# Patient Record
Sex: FEMALE | Race: BLACK OR AFRICAN AMERICAN | NOT HISPANIC OR LATINO | Employment: PART TIME | URBAN - METROPOLITAN AREA
[De-identification: names, ages, dates, MRNs, and addresses within clinical notes are randomized per-mention and may not be internally consistent; named-entity substitution may affect disease eponyms.]

---

## 2017-09-08 ENCOUNTER — OFFICE VISIT (OUTPATIENT)
Dept: URGENT CARE | Facility: CLINIC | Age: 32
End: 2017-09-08
Payer: MEDICAID

## 2017-09-08 VITALS
SYSTOLIC BLOOD PRESSURE: 142 MMHG | TEMPERATURE: 100 F | BODY MASS INDEX: 45.99 KG/M2 | RESPIRATION RATE: 18 BRPM | OXYGEN SATURATION: 98 % | HEART RATE: 82 BPM | HEIGHT: 67 IN | DIASTOLIC BLOOD PRESSURE: 86 MMHG | WEIGHT: 293 LBS

## 2017-09-08 DIAGNOSIS — R30.0 DYSURIA: ICD-10-CM

## 2017-09-08 DIAGNOSIS — N39.0 URINARY TRACT INFECTION WITHOUT HEMATURIA, SITE UNSPECIFIED: Primary | ICD-10-CM

## 2017-09-08 LAB
BILIRUB UR QL STRIP: NEGATIVE
GLUCOSE UR QL STRIP: NEGATIVE
KETONES UR QL STRIP: NEGATIVE
LEUKOCYTE ESTERASE UR QL STRIP: POSITIVE
PH, POC UA: 6.5 (ref 5–8)
POC BLOOD, URINE: NEGATIVE
POC NITRATES, URINE: NEGATIVE
PROT UR QL STRIP: POSITIVE
SP GR UR STRIP: 1.02 (ref 1–1.03)
UROBILINOGEN UR STRIP-ACNC: NORMAL (ref 0.1–1.1)

## 2017-09-08 PROCEDURE — 3008F BODY MASS INDEX DOCD: CPT | Mod: S$GLB,,, | Performed by: FAMILY MEDICINE

## 2017-09-08 PROCEDURE — 81003 URINALYSIS AUTO W/O SCOPE: CPT | Mod: QW,S$GLB,, | Performed by: FAMILY MEDICINE

## 2017-09-08 PROCEDURE — 99203 OFFICE O/P NEW LOW 30 MIN: CPT | Mod: 25,S$GLB,, | Performed by: FAMILY MEDICINE

## 2017-09-08 RX ORDER — CIPROFLOXACIN 500 MG/1
500 TABLET ORAL 2 TIMES DAILY
Qty: 20 TABLET | Refills: 0 | Status: SHIPPED | OUTPATIENT
Start: 2017-09-08 | End: 2017-09-18

## 2017-09-08 RX ORDER — PHENAZOPYRIDINE HYDROCHLORIDE 200 MG/1
200 TABLET, FILM COATED ORAL 3 TIMES DAILY PRN
Qty: 12 TABLET | Refills: 0 | Status: SHIPPED | OUTPATIENT
Start: 2017-09-08 | End: 2017-10-23 | Stop reason: ALTCHOICE

## 2017-09-08 NOTE — PROGRESS NOTES
"Subjective:       Patient ID: Jeb Stuart is a 31 y.o. female.    Vitals:  height is 5' 7" (1.702 m) and weight is 156.9 kg (346 lb) (abnormal). Her temperature is 99.9 °F (37.7 °C). Her blood pressure is 142/86 (abnormal) and her pulse is 82. Her respiration is 18 and oxygen saturation is 98%.     Chief Complaint: Urinary Tract Infection    Urinary Tract Infection    This is a new problem. Episode onset: 3 days. The problem occurs intermittently. The problem has been gradually worsening. The quality of the pain is described as burning. The pain is at a severity of 4/10. The pain is mild. There has been no fever. She is sexually active. There is no history of pyelonephritis. Associated symptoms include urgency. Pertinent negatives include no chills, hematuria, nausea or vomiting. She has tried NSAIDs for the symptoms. The treatment provided no relief. Her past medical history is significant for recurrent UTIs.     Review of Systems   Constitution: Negative for chills and fever.   Musculoskeletal: Positive for back pain.   Gastrointestinal: Positive for abdominal pain (lower). Negative for nausea and vomiting.   Genitourinary: Positive for dysuria and urgency. Negative for genital sores, hematuria, missed menses and non-menstrual bleeding.       Objective:      Physical Exam   Constitutional: She is oriented to person, place, and time. She appears well-developed and well-nourished.   HENT:   Head: Normocephalic and atraumatic.   Right Ear: External ear normal.   Left Ear: External ear normal.   Nose: Nose normal. No nasal deformity. No epistaxis.   Mouth/Throat: Oropharynx is clear and moist and mucous membranes are normal.   Eyes: Conjunctivae and lids are normal.   Neck: Trachea normal, normal range of motion and phonation normal. Neck supple.   Cardiovascular: Normal rate, regular rhythm, normal heart sounds and normal pulses.    Pulmonary/Chest: Effort normal and breath sounds normal.   Abdominal: Soft. Normal " appearance and bowel sounds are normal. She exhibits no distension and no mass. There is tenderness in the suprapubic area. There is no rigidity, no rebound, no guarding, no CVA tenderness, no tenderness at McBurney's point and negative Littlejohn's sign.       Neurological: She is alert and oriented to person, place, and time.   Skin: Skin is warm, dry and intact.   Psychiatric: She has a normal mood and affect. Her speech is normal and behavior is normal. Cognition and memory are normal.   Nursing note and vitals reviewed.    POCT Urinalysis, Dipstick, Automated, W/O Scope   Order: [id number]   Status:  Final result   Visible to patient:  No (Not Released) Next appt:  None Dx:  Dysuria    Ref Range & Units 15:24   POC Blood, Urine Negative  Negative    POC Bilirubin, Urine Negative  Negative    POC Urobilinogen, Urine 0.1 - 1.1 NORMAL    POC Ketones, Urine Negative  Negative    POC Protein, Urine Negative  Positive     Comments: 30 mg/ul   POC Nitrates, Urine Negative  Negative    POC Glucose, Urine Negative  Negative    pH, UA 5 - 8 6.5    POC Specific Gravity, Urine 1.003 - 1.029 1.025    POC Leukocytes, Urine Negative  Positive     Comments: 10 WBC/ul    Resulting Agency  [facility]      Specimen Collected: 09/08/17 15:24 Last Resulted: 09/08/17 15:24                 Assessment:       1. Urinary tract infection without hematuria, site unspecified    2. Dysuria        Plan:         Urinary tract infection without hematuria, site unspecified    Dysuria  -     POCT Urinalysis, Dipstick, Automated, W/O Scope    Other orders  -     ciprofloxacin HCl (CIPRO) 500 MG tablet; Take 1 tablet (500 mg total) by mouth 2 (two) times daily.  Dispense: 20 tablet; Refill: 0  -     phenazopyridine (PYRIDIUM) 200 MG tablet; Take 1 tablet (200 mg total) by mouth 3 (three) times daily as needed for Pain.  Dispense: 12 tablet; Refill: 0      Please return here or go to the Emergency Department for any concerns or worsening of condition.  If you  were prescribed antibiotics, please take them to completion.  If you were prescribed a narcotic medication, do not drive or operate heavy equipment or machinery while taking these medications.  Please follow up with your primary care doctor or specialist as needed.  Please drink plenty of fluids.  Please get plenty of rest.  If you were prescribed Pyridium (phenazopyridine), please be aware that if you wear contact lens that this medication may stain your contacts.  While taking this medication it is recommended that you do not wear your contacts until 24 hours after your last dose.    Push fluids aggressively to improve symptoms and wash through the infection.  Cranberry juice can help relieve symptoms  If you  smoke, please stop smoking.    Please follow up with your primary care doctor or specialist as needed.    Zarina Coffman MD  [phone]

## 2017-09-08 NOTE — PATIENT INSTRUCTIONS
"Please return here or go to the Emergency Department for any concerns or worsening of condition.  If you were prescribed antibiotics, please take them to completion.  If you were prescribed a narcotic medication, do not drive or operate heavy equipment or machinery while taking these medications.  Please follow up with your primary care doctor or specialist as needed.  Please drink plenty of fluids.  Please get plenty of rest.  If you were prescribed Pyridium (phenazopyridine), please be aware that if you wear contact lens that this medication may stain your contacts.  While taking this medication it is recommended that you do not wear your contacts until 24 hours after your last dose.    Push fluids aggressively to improve symptoms and wash through the infection.  Cranberry juice can help relieve symptoms  If you  smoke, please stop smoking.    Please follow up with your primary care doctor or specialist as needed.    Zarina Coffman MD  [phone]      Bladder Infection, Female (Adult)    Urine is normally doesn't have any bacteria in it. But bacteria can get into the urinary tract from the skin around the rectum. Or they can travel in the blood from elsewhere in the body. Once they are in your urinary tract, they can cause infection in the urethra (urethritis), the bladder (cystitis), or the kidneys (pyelonephritis).  The most common place for an infection is in the bladder. This is called a bladder infection. This is one of the most common infections in women. Most bladder infections are easily treated. They are not serious unless the infection spreads to the kidney.  The phrases "bladder infection," "UTI," and "cystitis" are often used to describe the same thing. But they are not always the same. Cystitis is an inflammation of the bladder. The most common cause of cystitis is an infection.  Symptoms  The infection causes inflammation in the urethra and bladder. This causes many of the symptoms. The most " common symptoms of a bladder infection are:  · Pain or burning when urinating  · Having to urinate more often than usual  · Urgent need to urinate  · Only a small amount of urine comes out  · Blood in urine  · Abdominal discomfort. This is usually in the lower abdomen above the pubic bone.  · Cloudy urine  · Strong- or bad-smelling urine  · Unable to urinate (urinary retention)  · Unable to hold urine in (urinary incontinence)  · Fever  · Loss of appetite  · Confusion (in older adults)  Causes  Bladder infections are not contagious. You can't get one from someone else, from a toilet seat, or from sharing a bath.  The most common cause of bladder infections is bacteria from the bowels. The bacteria get onto the skin around the opening of the urethra. From there, they can get into the urine and travel up to the bladder, causing inflammation and infection. This usually happens because of:  · Wiping improperly after urinating. Always wipe from front to back.  · Bowel incontinence  · Pregnancy  · Procedures such as having a catheter inserted  · Older age  · Not emptying your bladder. This can allow bacteria a chance to grow in your urine.  · Dehydration  · Constipation  · Sex  · Use of a diaphragm for birth control   Treatment  Bladder infections are diagnosed by a urine test. They are treated with antibiotics and usually clear up quickly without complications. Treatment helps prevent a more serious kidney infection.  Medicines  Medicines can help in the treatment of a bladder infection:  · Take antibiotics until they are used up, even if you feel better. It is important to finish them to make sure the infection has cleared.  · You can use acetaminophen or ibuprofen for pain, fever, or discomfort, unless another medicine was prescribed. If you have chronic liver or kidney disease, talk with your healthcare provider before using these medicines. Also talk with your provider if you've ever had a stomach ulcer or  gastrointestinal bleeding, or are taking blood-thinner medicines.  · If you are given phenazopydridine to reduce burning with urination, it will cause your urine to become a bright orange color. This can stain clothing.  Care and prevention  These self-care steps can help prevent future infections:  · Drink plenty of fluids to prevent dehydration and flush out your bladder. Do this unless you must restrict fluids for other health reasons, or your doctor told you not to.  · Proper cleaning after going to the bathroom is important. Wipe from front to back after using the toilet to prevent the spread of bacteria.  · Urinate more often. Don't try to hold urine in for a long time.  · Wear loose-fitting clothes and cotton underwear. Avoid tight-fitting pants.  · Improve your diet and prevent constipation. Eat more fresh fruit and vegetables, and fiber, and less junk and fatty foods.  · Avoid sex until your symptoms are gone.  · Avoid caffeine, alcohol, and spicy foods. These can irritate your bladder.  · Urinate right after intercourse to flush out your bladder.  · If you use birth control pills and have frequent bladder infections, discuss it with your doctor.  Follow-up care  Call your healthcare provider if all symptoms are not gone after 3 days of treatment. This is especially important if you have repeat infections.  If a culture was done, you will be told if your treatment needs to be changed. If directed, you can call to find out the results.  If X-rays were done, you will be told if the results will affect your treatment.  Call 911  Call 911 if any of the following occur:  · Trouble breathing  · Hard to wake up or confusion  · Fainting or loss of consciousness  · Rapid heart rate  When to seek medical advice  Call your healthcare provider right away if any of these occur:  · Fever of 100.4ºF (38.0ºC) or higher, or as directed by your healthcare provider  · Symptoms are not better by the third day of  treatment  · Back or belly (abdominal) pain that gets worse  · Repeated vomiting, or unable to keep medicine down  · Weakness or dizziness  · Vaginal discharge  · Pain, redness, or swelling in the outer vaginal area (labia)  Date Last Reviewed: 10/1/2016  © 3725-2932 [employer]. [address] [place] [address]. All rights reserved. This information is not intended as a substitute for professional medical care. Always follow your healthcare professional's instructions.

## 2017-09-12 ENCOUNTER — TELEPHONE (OUTPATIENT)
Dept: URGENT CARE | Facility: CLINIC | Age: 32
End: 2017-09-12

## 2017-09-12 NOTE — TELEPHONE ENCOUNTER
Patient called back stating never filled pain medicine prescribed due to high costs, Per Dr. Rivera, told patient to get AZO over the counter, per Dr. Rivera orders.

## 2017-10-26 ENCOUNTER — OFFICE VISIT (OUTPATIENT)
Dept: URGENT CARE | Facility: CLINIC | Age: 32
End: 2017-10-26
Payer: MEDICAID

## 2017-10-26 VITALS
BODY MASS INDEX: 51.53 KG/M2 | OXYGEN SATURATION: 100 % | SYSTOLIC BLOOD PRESSURE: 146 MMHG | DIASTOLIC BLOOD PRESSURE: 89 MMHG | HEART RATE: 80 BPM | TEMPERATURE: 98 F | WEIGHT: 293 LBS

## 2017-10-26 DIAGNOSIS — Z20.2 POSSIBLE EXPOSURE TO STD: ICD-10-CM

## 2017-10-26 DIAGNOSIS — R30.0 DYSURIA: Primary | ICD-10-CM

## 2017-10-26 LAB
BILIRUB UR QL STRIP: NEGATIVE
GLUCOSE UR QL STRIP: NEGATIVE
KETONES UR QL STRIP: NEGATIVE
LEUKOCYTE ESTERASE UR QL STRIP: NEGATIVE
PH, POC UA: 7.5 (ref 5–8)
POC BLOOD, URINE: NEGATIVE
POC NITRATES, URINE: NEGATIVE
PROT UR QL STRIP: NEGATIVE
SP GR UR STRIP: 1.01 (ref 1–1.03)
UROBILINOGEN UR STRIP-ACNC: NORMAL (ref 0.1–1.1)

## 2017-10-26 PROCEDURE — 99203 OFFICE O/P NEW LOW 30 MIN: CPT | Mod: 25,S$GLB,, | Performed by: SURGERY

## 2017-10-26 PROCEDURE — 81003 URINALYSIS AUTO W/O SCOPE: CPT | Mod: QW,S$GLB,, | Performed by: SURGERY

## 2017-10-26 RX ORDER — CEFTRIAXONE 1 G/1
500 INJECTION, POWDER, FOR SOLUTION INTRAMUSCULAR; INTRAVENOUS ONCE
Status: COMPLETED | OUTPATIENT
Start: 2017-10-26 | End: 2017-10-26

## 2017-10-26 RX ORDER — METRONIDAZOLE 500 MG/1
2000 TABLET ORAL ONCE
Qty: 4 TABLET | Refills: 0 | Status: SHIPPED | OUTPATIENT
Start: 2017-10-26 | End: 2017-10-26

## 2017-10-26 RX ORDER — AZITHROMYCIN 500 MG/1
1000 TABLET, FILM COATED ORAL ONCE
Qty: 2 TABLET | Refills: 0 | Status: SHIPPED | OUTPATIENT
Start: 2017-10-26 | End: 2017-10-26

## 2017-10-26 RX ADMIN — CEFTRIAXONE 500 MG: 1 INJECTION, POWDER, FOR SOLUTION INTRAMUSCULAR; INTRAVENOUS at 12:10

## 2017-10-26 NOTE — PATIENT INSTRUCTIONS
Dysuria with Uncertain Cause (Adult)    The urethra is the tube that allows urine to pass out of the body. In a woman, the urethra is the opening above the vagina. In men, the urethra is the opening on the tip of the penis. Dysuria is the feeling of pain or burning in the urethra when passing urine.  Dysuria can be caused by anything that irritates or inflames the urethra. An infection or chemical irritation can cause this reaction. A bladder infection is the most common cause of dysuria in adults. A urine test can diagnose this. A bladder infection needs antibiotic treatment.  Soaps, lotions, colognes and feminine hygiene products can cause dysuria. So can birth control jellies, creams, and foams. It will go away 1 to 3 days after using these irritants.  Sexually transmitted diseases (STDs) such as chlamydia or gonorrhea can cause dysuria. Your healthcare provider may take a culture sample. Your provider may start you on antibiotic medicine before the culture test returns.  In women who have gone through menopause, dysuria can be from dryness in the lining of the urethra. This can be treated with hormones. Dysuria becomes long-term (chronic) when it lasts for weeks or months. You may need to see a specialist (urologist) to diagnose and treat chronic dysuria.  Home care  These home care tips may help:  · Don't use any chemicals or products that you think may be causing your symptoms.  · If you were given a prescription medicine, take as directed. Be sure to take it until it is all used up.  · If a culture was taken, don't have sex until you have been told that it is negative. This means you don't have an infection. Then follow your healthcare provider's advice to treat your condition.  If a culture was done and it is positive:  · Both you and your sexual partner may need to be treated. This is true even if your partner has no symptoms.  · Contact your healthcare provider or go to an urgent care clinic or the  [facility] health department to be looked at and treated.  · Don't have sex until both you and your partner(s) have finished all antibiotics and your healthcare provider says you are no longer contagious.  · Learn about and use safe sex practices. The safest sex is with a partner who has tested negative and only has sex with you. Condoms can prevent STDs from spreading, but they aren't a guarantee.  Follow-up care  Follow up with your healthcare provider, or as advised. If a culture was taken, you may call as directed for the results. If you have an STD, follow up with your provider or the public health department for a complete STD screening, including HIV testing. For more information, contact CDC-INFO at 901-628-0633.  When to seek medical advice  Call your healthcare provider right away if any of these occur:  · You aren't better after 3 days of treatment  · Fever of 100.4ºF (38ºC) or higher, or as directed by your healthcare provider  · Back or belly pain that gets worse  · You can't urinate because of pain  · New discharge from the urethra, vagina, or penis  · Painful sores on the penis  · Rash or joint pain  · Painful lumps (lymph nodes) in the groin  · Testicle pain or swelling of the scrotum  Date Last Reviewed: 11/1/2016  © 7897-9610 [employer]. [address] [place] [address], [place] [address]. All rights reserved. This information is not intended as a substitute for professional medical care. Always follow your healthcare professional's instructions.

## 2017-10-26 NOTE — PROGRESS NOTES
Subjective:       Patient ID: Jeb Stuart is a 32 y.o. female.    Vitals:  weight is 149.2 kg (329 lb) (abnormal). Her oral temperature is 97.6 °F (36.4 °C). Her blood pressure is 146/89 (abnormal) and her pulse is 80. Her oxygen saturation is 100%.     Chief Complaint: Urinary Tract Infection    Urinary Tract Infection    This is a new problem. The current episode started yesterday. The problem has been gradually worsening. The quality of the pain is described as burning. The pain is at a severity of 10/10. The pain is severe. There has been no fever. She is sexually active. Associated symptoms include a discharge, frequency and urgency. Pertinent negatives include no chills, hematuria, nausea or vomiting. She has tried nothing for the symptoms.     Review of Systems   Constitution: Negative for chills and fever.   Skin: Negative for itching.   Musculoskeletal: Negative for back pain.   Gastrointestinal: Negative for abdominal pain, nausea and vomiting.   Genitourinary: Positive for frequency and urgency. Negative for dysuria, genital sores, hematuria, missed menses and non-menstrual bleeding.       Objective:      Physical Exam   Constitutional: She is oriented to person, place, and time. She appears well-developed and well-nourished.   HENT:   Head: Normocephalic and atraumatic.   Right Ear: External ear normal.   Left Ear: External ear normal.   Nose: Nose normal.   Mouth/Throat: Mucous membranes are normal.   Eyes: Conjunctivae and lids are normal.   Neck: Trachea normal and full passive range of motion without pain. Neck supple.   Cardiovascular: Normal rate, regular rhythm and normal heart sounds.    Pulmonary/Chest: Effort normal and breath sounds normal. No respiratory distress.   Abdominal: Soft. Normal appearance and bowel sounds are normal. She exhibits no distension, no abdominal bruit, no pulsatile midline mass and no mass. There is no tenderness.   Musculoskeletal: Normal range of motion.  She exhibits no edema.   Neurological: She is alert and oriented to person, place, and time. She has normal strength.   Skin: Skin is warm, dry and intact. She is not diaphoretic. No pallor.   Psychiatric: She has a normal mood and affect. Her speech is normal and behavior is normal. Judgment and thought content normal. Cognition and memory are normal.   Nursing note and vitals reviewed.      Assessment:       1. Dysuria    2. Possible exposure to STD        Plan:         Dysuria  -     POCT Urinalysis, Dipstick, Automated, W/O Scope  -     metroNIDAZOLE (FLAGYL) 500 MG tablet; Take 4 tablets (2,000 mg total) by mouth once.  Dispense: 4 tablet; Refill: 0    Possible exposure to STD    Other orders  -     azithromycin (ZITHROMAX) 500 MG tablet; Take 2 tablets (1,000 mg total) by mouth once.  Dispense: 2 tablet; Refill: 0  -     cefTRIAXone injection 500 mg; Inject 0.5 g (500 mg total) into the muscle once.

## 2017-11-07 ENCOUNTER — TELEPHONE (OUTPATIENT)
Dept: URGENT CARE | Facility: CLINIC | Age: 32
End: 2017-11-07

## 2017-12-16 ENCOUNTER — OFFICE VISIT (OUTPATIENT)
Dept: URGENT CARE | Facility: CLINIC | Age: 32
End: 2017-12-16
Payer: MEDICAID

## 2017-12-16 VITALS
SYSTOLIC BLOOD PRESSURE: 139 MMHG | OXYGEN SATURATION: 100 % | TEMPERATURE: 99 F | HEART RATE: 72 BPM | WEIGHT: 293 LBS | DIASTOLIC BLOOD PRESSURE: 74 MMHG | BODY MASS INDEX: 47.09 KG/M2 | HEIGHT: 66 IN

## 2017-12-16 DIAGNOSIS — R30.0 DYSURIA: Primary | ICD-10-CM

## 2017-12-16 DIAGNOSIS — J02.9 ACUTE PHARYNGITIS, UNSPECIFIED ETIOLOGY: ICD-10-CM

## 2017-12-16 LAB
BILIRUB UR QL STRIP: NEGATIVE
GLUCOSE UR QL STRIP: NEGATIVE
KETONES UR QL STRIP: NEGATIVE
LEUKOCYTE ESTERASE UR QL STRIP: NEGATIVE
PH, POC UA: 5.5 (ref 5–8)
POC BLOOD, URINE: NEGATIVE
POC NITRATES, URINE: NEGATIVE
PROT UR QL STRIP: NEGATIVE
SP GR UR STRIP: 1.02 (ref 1–1.03)
UROBILINOGEN UR STRIP-ACNC: NORMAL (ref 0.1–1.1)

## 2017-12-16 PROCEDURE — 99214 OFFICE O/P EST MOD 30 MIN: CPT | Mod: 25,S$GLB,, | Performed by: FAMILY MEDICINE

## 2017-12-16 PROCEDURE — 81003 URINALYSIS AUTO W/O SCOPE: CPT | Mod: QW,S$GLB,, | Performed by: FAMILY MEDICINE

## 2017-12-16 RX ORDER — DEXAMETHASONE SODIUM PHOSPHATE 4 MG/ML
4 INJECTION, SOLUTION INTRA-ARTICULAR; INTRALESIONAL; INTRAMUSCULAR; INTRAVENOUS; SOFT TISSUE
Status: COMPLETED | OUTPATIENT
Start: 2017-12-16 | End: 2017-12-16

## 2017-12-16 RX ORDER — LEVOFLOXACIN 500 MG/1
500 TABLET, FILM COATED ORAL DAILY
Qty: 10 TABLET | Refills: 0 | Status: SHIPPED | OUTPATIENT
Start: 2017-12-16 | End: 2017-12-18 | Stop reason: ALTCHOICE

## 2017-12-16 RX ADMIN — DEXAMETHASONE SODIUM PHOSPHATE 4 MG: 4 INJECTION, SOLUTION INTRA-ARTICULAR; INTRALESIONAL; INTRAMUSCULAR; INTRAVENOUS; SOFT TISSUE at 05:12

## 2017-12-16 NOTE — LETTER
December 16, 2017  Jeb Stuart  [address]  [place] [address]                [facility] [place]  [address], [address]  [place] [address]  Phone: [phone]  Fax: [phone] Jeb Sturat was seen and treated in our Urgent Care department on 12/16/2017. She may return to work in 2 - 3 days.      If you have any questions or concerns, please don't hesitate to call.    Sincerely,        Lacho Back MD

## 2017-12-16 NOTE — PATIENT INSTRUCTIONS
Please drink plenty of fluids.  Please get plenty of rest.  Please return here or go to the Emergency Department for any concerns or worsening of condition.  If you were given wait & see antibiotics, please wait 3-5 days before taking them, and only take them if your symptoms have worsened or not improved.  If you do begin taking the antibiotics, please take them to completion.  If you were prescribed antibiotics, please take them to completion.    If you were prescribed a narcotic medication, do not drive or operate heavy equipment or machinery while taking these medications.    You were given a decongestant (RESCON or POLY VENT Dm).  If your insurance does not cover it or you cannot afford it, it is ok to use the over the counter products listed below.  If you do not have Hypertension or any history of palpitations, it is ok to take over the counter Sudafed or Mucinex D or Allegra-D or Claritin-D or Zyrtec-D.  If you do take one of the above, it is ok to combine that with plain over the counter Mucinex or Allegra or Claritin or Zyrtec.  If for example you are taking Zyrtec -D, you can combine that with Mucinex, but not Mucinex-D.  If you are taking Mucinex-D, you can combine that with plain Allegra or Claritin or Zyrtec.   If you do have Hypertension or palpitations, it is safe to take Coricidin HBP for relief of sinus symptoms.    We recommend you take over the counter Flonase (Fluticasone) or another nasally inhaled steroid unless you are already taking one.      Nasal irrigation with a saline spray or Netti Pot like device per their directions is also recommended.  If not allergic, please take over the counter Tylenol (Acetaminophen)  as directed for control of pain and/or fever.  You may take Sudafed every 6 hours as directed for congestion.    You were given an injection of steroid.  This will relieve swelling and inflammation and improve respiratory symptoms.  It can raise your blood sugar if you are  diabetic.    Please follow up with your primary care doctor or specialist as needed.    Zarina Coffman MD  [phone]    If you  smoke, please stop smoking.         Pharyngitis: Strep (Presumed)    You have pharyngitis (sore throat). The cause is thought to be the streptococcus, or strep, bacterium. Strep throat infection can cause throat pain that is worse when swallowing, aching all over, headache, and fever. The infection may be spread by coughing, kissing, or touching others after touching your mouth or nose. Antibiotic medications are given to treat the infection.  Home care  · Rest at home. Drink plenty of fluids to avoid dehydration.  · No work or school for the first 2 days of taking the antibiotics. After this time, you will not be contagious. You can then return to work or school if you are feeling better.   · The antibiotic medication must be taken for the full 10 days, even if you feel better. This is very important to ensure the infection is treated. It is also important to prevent drug-resistant organisms from developing. If you were given an antibiotic shot, no more antibiotics are needed.  · You may use acetaminophen or ibuprofen to control pain or fever, unless another medicine was prescribed for this. If you have chronic liver or kidney disease or ever had a stomach ulcer or GI bleeding, talk with your doctor before using these medicines.  · Throat lozenges or a throat-numbing sprays can help reduce throat pain. Gargling with warm salt water can also help. Dissolve 1/2 teaspoon of salt in 1 8 ounce glass of warm water.   · Avoid salty or spicy foods, which can irritate the throat.  Follow-up care  Follow up with your healthcare provider or our staff if you are not improving over the next week.  When to seek medical advice  Call your healthcare provider right away if any of these occur:  · Fever as directed by your doctor.   · New or worsening ear pain, sinus pain, or headache  · Painful lumps in  the back of neck  · Stiff neck  · Lymph nodes are getting larger  · Inability to swallow liquids, excessive drooling, or inability to open mouth wide due to throat pain  · Signs of dehydration (very dark urine or no urine, sunken eyes, dizziness)  · Trouble breathing or noisy breathing  · Muffled voice  · New rash  Date Last Reviewed: 4/13/2015  © 0502-8308 [employer]. [address] [place] [address], [place] [address]. All rights reserved. This information is not intended as a substitute for professional medical care. Always follow your healthcare professional's instructions.

## 2017-12-16 NOTE — PROGRESS NOTES
"Subjective:       Patient ID: Jeb Stuart is a 32 y.o. female.    Vitals:  height is 5' 6" (1.676 m) and weight is 144.2 kg (318 lb) (abnormal). Her oral temperature is 99 °F (37.2 °C). Her blood pressure is 139/74 and her pulse is 72. Her oxygen saturation is 100%.     Chief Complaint: Cough and Urinary Tract Infection    Cough   This is a new problem. The current episode started in the past 7 days. The problem has been gradually worsening. The problem occurs every few minutes. The cough is productive of sputum. Associated symptoms include ear pain, headaches and nasal congestion. Pertinent negatives include no chest pain, chills, fever, rash, sore throat or shortness of breath. The symptoms are aggravated by lying down. Treatments tried: thera flu. The treatment provided no relief.   Urinary Tract Infection    This is a new problem. The current episode started in the past 7 days. The problem has been unchanged. The quality of the pain is described as burning. The pain is at a severity of 10/10. The pain is severe. There has been no fever. Associated symptoms include flank pain, frequency and urgency. Pertinent negatives include no chills, hematuria, nausea, vomiting or rash. Treatments tried: azo. The treatment provided no relief. Her past medical history is significant for hypertension.     Review of Systems   Constitution: Negative for chills and fever.   HENT: Positive for ear pain. Negative for sore throat.    Eyes: Negative for blurred vision.   Cardiovascular: Negative for chest pain.   Respiratory: Positive for cough and sputum production. Negative for shortness of breath.    Skin: Negative for rash.   Musculoskeletal: Negative for back pain and joint pain.   Gastrointestinal: Positive for abdominal pain. Negative for diarrhea, nausea and vomiting.   Genitourinary: Positive for flank pain, frequency and urgency. Negative for hematuria.   Neurological: Positive for headaches. "   Psychiatric/Behavioral: The patient is not nervous/anxious.        Objective:      Physical Exam   Constitutional: She is oriented to person, place, and time. She appears well-developed and well-nourished. She is cooperative.  Non-toxic appearance. She does not appear ill. No distress.   HENT:   Head: Normocephalic and atraumatic.   Right Ear: Hearing, tympanic membrane, external ear and ear canal normal.   Left Ear: Hearing, tympanic membrane, external ear and ear canal normal.   Nose: Nose normal. No mucosal edema, rhinorrhea or nasal deformity. No epistaxis. Right sinus exhibits no maxillary sinus tenderness and no frontal sinus tenderness. Left sinus exhibits no maxillary sinus tenderness and no frontal sinus tenderness.   Mouth/Throat: Uvula is midline and mucous membranes are normal. No trismus in the jaw. Normal dentition. No uvula swelling. Posterior oropharyngeal edema and posterior oropharyngeal erythema present.   Eyes: Conjunctivae and lids are normal. No scleral icterus.   Sclera clear bilat   Neck: Trachea normal, full passive range of motion without pain and phonation normal. Neck supple.   Cardiovascular: Normal rate, regular rhythm, normal heart sounds, intact distal pulses and normal pulses.    Pulmonary/Chest: Effort normal and breath sounds normal. No respiratory distress.   Abdominal: Soft. Normal appearance and bowel sounds are normal. She exhibits no distension. There is no tenderness.   Musculoskeletal: Normal range of motion. She exhibits no edema or deformity.   Neurological: She is alert and oriented to person, place, and time. She exhibits normal muscle tone. Coordination normal.   Skin: Skin is warm, dry and intact. She is not diaphoretic. No pallor.   Psychiatric: She has a normal mood and affect. Her speech is normal and behavior is normal. Judgment and thought content normal. Cognition and memory are normal.   Nursing note and vitals reviewed.    POCT Urinalysis, Dipstick,  Automated, W/O Scope   Order: [id number]   Status:  Final result   Visible to patient:  No (Not Released) Next appt:  12/18/2017 at 08:00 AM in Ophthalmology (OPHTHALMOLOGY GENERAL, [facility]) Dx:  Dysuria     Ref Range & Units 17:14 1mo ago 3mo ago    POC Blood, Urine Negative Negative  Negative  Negative     POC Bilirubin, Urine Negative Negative  Negative  Negative     POC Urobilinogen, Urine 0.1 - 1.1 Normal  Normal  NORMAL     POC Ketones, Urine Negative Negative  Negative  Negative     POC Protein, Urine Negative Negative  Negative  PositiveCM      POC Nitrates, Urine Negative Negative  Negative  Negative     POC Glucose, Urine Negative Negative  Negative  Negative     pH, UA 5 - 8 5.5  7.5  6.5     POC Specific Gravity, Urine 1.003 - 1.029 1.025  1.015  1.025     POC Leukocytes, Urine Negative Negative  Negative  PositiveCM     Resulting Agency  [facility]      Specimen Collected: 12/16/17 17:14 Last Resulted: 12/16/17 17:14                Assessment:       1. Dysuria    2. Acute pharyngitis, unspecified etiology        Plan:         Dysuria  -     POCT Urinalysis, Dipstick, Automated, W/O Scope    Acute pharyngitis, unspecified etiology    Other orders  -     levoFLOXacin (LEVAQUIN) 500 MG tablet; Take 1 tablet (500 mg total) by mouth once daily.  Dispense: 10 tablet; Refill: 0  -     pseudoephedrine-DM-guaifenesin (POLY-VENT DM) 60- mg Tab; Take 1 tablet by mouth every 6 (six) hours as needed.  Dispense: 20 tablet; Refill: 0  -     dexamethasone injection 4 mg; Inject 1 mL (4 mg total) into the muscle one time.      Please drink plenty of fluids.  Please get plenty of rest.  Please return here or go to the Emergency Department for any concerns or worsening of condition.  If you were given wait & see antibiotics, please wait 3-5 days before taking them, and only take them if your symptoms have worsened or not improved.  If you do begin taking the antibiotics, please take them to completion.  If you  were prescribed antibiotics, please take them to completion.  If you were prescribed a narcotic medication, do not drive or operate heavy equipment or machinery while taking these medications.    You were given a decongestant (RESCON or POLY VENT Dm).  If your insurance does not cover it or you cannot afford it, it is ok to use the over the counter products listed below.  If you do not have Hypertension or any history of palpitations, it is ok to take over the counter Sudafed or Mucinex D or Allegra-D or Claritin-D or Zyrtec-D.  If you do take one of the above, it is ok to combine that with plain over the counter Mucinex or Allegra or Claritin or Zyrtec.  If for example you are taking Zyrtec -D, you can combine that with Mucinex, but not Mucinex-D.  If you are taking Mucinex-D, you can combine that with plain Allegra or Claritin or Zyrtec.   If you do have Hypertension or palpitations, it is safe to take Coricidin HBP for relief of sinus symptoms.    We recommend you take over the counter Flonase (Fluticasone) or another nasally inhaled steroid unless you are already taking one.  Nasal irrigation with a saline spray or Netti Pot like device per their directions is also recommended.  If not allergic, please take over the counter Tylenol (Acetaminophen) and/or Motrin (Ibuprofen) as directed for control of pain and/or fever.    Robitussin DM 2 teas every 4 hours as needed for cough.  If you  smoke, please stop smoking.    You were given an injection of steroid.  This will relieve swelling and inflammation and improve respiratory symptoms.  It can raise your blood sugar if you are diabetic.    Please follow up with your primary care doctor or specialist as needed.  Zarina Coffman MD  [phone]

## 2018-02-21 PROBLEM — N92.0 EXCESSIVE OR FREQUENT MENSTRUATION: Status: ACTIVE | Noted: 2018-02-21

## 2018-03-21 ENCOUNTER — OFFICE VISIT (OUTPATIENT)
Dept: URGENT CARE | Facility: CLINIC | Age: 33
End: 2018-03-21
Payer: MEDICAID

## 2018-03-21 VITALS
SYSTOLIC BLOOD PRESSURE: 145 MMHG | OXYGEN SATURATION: 97 % | BODY MASS INDEX: 48.82 KG/M2 | HEART RATE: 72 BPM | RESPIRATION RATE: 18 BRPM | TEMPERATURE: 99 F | WEIGHT: 293 LBS | DIASTOLIC BLOOD PRESSURE: 83 MMHG | HEIGHT: 65 IN

## 2018-03-21 DIAGNOSIS — E66.01 CLASS 3 SEVERE OBESITY DUE TO EXCESS CALORIES WITH SERIOUS COMORBIDITY AND BODY MASS INDEX (BMI) OF 50.0 TO 59.9 IN ADULT: ICD-10-CM

## 2018-03-21 DIAGNOSIS — R30.0 DYSURIA: Primary | ICD-10-CM

## 2018-03-21 LAB
BILIRUB UR QL STRIP: NEGATIVE
GLUCOSE UR QL STRIP: NEGATIVE
KETONES UR QL STRIP: NEGATIVE
LEUKOCYTE ESTERASE UR QL STRIP: NEGATIVE
PH, POC UA: 6 (ref 5–8)
POC BLOOD, URINE: POSITIVE
POC NITRATES, URINE: NEGATIVE
PROT UR QL STRIP: NEGATIVE
SP GR UR STRIP: 1.02 (ref 1–1.03)
UROBILINOGEN UR STRIP-ACNC: ABNORMAL (ref 0.1–1.1)

## 2018-03-21 PROCEDURE — 81003 URINALYSIS AUTO W/O SCOPE: CPT | Mod: QW,S$GLB,, | Performed by: SURGERY

## 2018-03-21 PROCEDURE — 99214 OFFICE O/P EST MOD 30 MIN: CPT | Mod: 25,S$GLB,, | Performed by: SURGERY

## 2018-03-21 RX ORDER — NYSTATIN AND TRIAMCINOLONE ACETONIDE 100000; 1 [USP'U]/G; MG/G
CREAM TOPICAL 2 TIMES DAILY
Qty: 1 TUBE | Refills: 1 | Status: SHIPPED | OUTPATIENT
Start: 2018-03-21 | End: 2019-08-22

## 2018-03-21 NOTE — PROGRESS NOTES
"Subjective:       Patient ID: Jeb Stuart is a 32 y.o. female.    Vitals:  height is 5' 5" (1.651 m) and weight is 145.2 kg (320 lb) (abnormal). Her temperature is 98.7 °F (37.1 °C). Her blood pressure is 145/83 (abnormal) and her pulse is 72. Her respiration is 18 and oxygen saturation is 97%.     Chief Complaint: Dysuria    Dysuria    This is a new problem. The current episode started gradual onset. The problem has been gradually worsening. The quality of the pain is described as shooting and burning. The pain is at a severity of 5/10. The pain is moderate. There has been no fever. The fever has been present for 3 - 4 days. She is sexually active. There is no history of pyelonephritis. Associated symptoms include frequency. Pertinent negatives include no chills, nausea, vomiting or rash. She has tried nothing for the symptoms.     Review of Systems   Constitution: Negative for chills and fever.   HENT: Negative for sore throat.    Eyes: Negative for blurred vision.   Cardiovascular: Negative for chest pain.   Respiratory: Negative for shortness of breath.    Skin: Negative for rash.   Musculoskeletal: Negative for back pain and joint pain.   Gastrointestinal: Negative for abdominal pain, diarrhea, nausea and vomiting.   Genitourinary: Positive for dysuria and frequency.   Neurological: Negative for headaches.   Psychiatric/Behavioral: The patient is not nervous/anxious.        Objective:      Physical Exam   Constitutional: She is oriented to person, place, and time. She appears well-developed and well-nourished. She is cooperative.  Non-toxic appearance. She does not appear ill. No distress.   HENT:   Head: Normocephalic and atraumatic.   Right Ear: Hearing, tympanic membrane, external ear and ear canal normal.   Left Ear: Hearing, tympanic membrane, external ear and ear canal normal.   Nose: Nose normal. No mucosal edema, rhinorrhea or nasal deformity. No epistaxis. Right sinus exhibits no maxillary " sinus tenderness and no frontal sinus tenderness. Left sinus exhibits no maxillary sinus tenderness and no frontal sinus tenderness.   Mouth/Throat: Uvula is midline, oropharynx is clear and moist and mucous membranes are normal. No trismus in the jaw. Normal dentition. No uvula swelling. No posterior oropharyngeal erythema.   Eyes: Conjunctivae and lids are normal. Right eye exhibits no discharge. Left eye exhibits no discharge. No scleral icterus.   Sclera clear bilat   Neck: Trachea normal, normal range of motion, full passive range of motion without pain and phonation normal. Neck supple.   Cardiovascular: Normal rate, regular rhythm, normal heart sounds, intact distal pulses and normal pulses.    Pulmonary/Chest: Effort normal and breath sounds normal. No respiratory distress.   Abdominal: Soft. Normal appearance and bowel sounds are normal. She exhibits no distension, no pulsatile midline mass and no mass. There is no tenderness.   Musculoskeletal: Normal range of motion. She exhibits no edema or deformity.   Neurological: She is alert and oriented to person, place, and time. She exhibits normal muscle tone. Coordination normal.   Skin: Skin is warm, dry and intact. She is not diaphoretic. No pallor.   Psychiatric: She has a normal mood and affect. Her speech is normal and behavior is normal. Judgment and thought content normal. Cognition and memory are normal.   Nursing note and vitals reviewed.      Assessment:       1. Dysuria    2. Class 3 severe obesity due to excess calories with serious comorbidity and body mass index (BMI) of 50.0 to 59.9 in adult        Plan:         Dysuria  -     POCT Urinalysis, Dipstick, Automated, W/O Scope  -     nystatin-triamcinolone (MYCOLOG II) cream; Apply topically 2 (two) times daily. Apply to affected area for 10-14 days  Dispense: 1 Tube; Refill: 1    Class 3 severe obesity due to excess calories with serious comorbidity and body mass index (BMI) of 50.0 to 59.9 in  adult        D/w patient healthy food choices and benefits of moderate weight loss

## 2018-03-21 NOTE — PATIENT INSTRUCTIONS
Anatomy of the Female Urinary Tract  Your urinary tract helps get rid of urine (your bodys liquid waste). The kidneys collect chemicals and water your body doesnt need. This is turned into urine. Urine travels out of the kidneys through the ureters to the bladder. The bladder holds urine until youre ready to release it. The urethra carries urine from the bladder out of the body. The main sphincter muscle circles the mid-urethra.      Front view of female urinary tract.     Date Last Reviewed: 1/1/2017 © 2000-2017 [employer]. [address] [place] [id number]. All rights reserved. This information is not intended as a substitute for professional medical care. Always follow your healthcare professional's instructions.

## 2020-05-22 ENCOUNTER — APPOINTMENT (OUTPATIENT)
Dept: RADIOLOGY | Facility: HOSPITAL | Age: 35
End: 2020-05-22
Payer: MEDICAID

## 2020-05-22 ENCOUNTER — HOSPITAL ENCOUNTER (INPATIENT)
Facility: HOSPITAL | Age: 35
LOS: 1 days | Discharge: HOME | DRG: 123 | End: 2020-05-23
Attending: INTERNAL MEDICINE | Admitting: INTERNAL MEDICINE
Payer: MEDICAID

## 2020-05-22 DIAGNOSIS — G37.9 DEMYELINATING CHANGES IN BRAIN: ICD-10-CM

## 2020-05-22 DIAGNOSIS — H46.9 OPTIC NEURITIS: ICD-10-CM

## 2020-05-22 DIAGNOSIS — H46.9 OPTIC NEURITIS: Primary | ICD-10-CM

## 2020-05-22 PROBLEM — E66.01 MORBID OBESITY WITH BMI OF 50.0-59.9, ADULT: Status: ACTIVE | Noted: 2020-02-24

## 2020-05-22 PROBLEM — I10 ESSENTIAL HYPERTENSION: Status: ACTIVE | Noted: 2020-02-24

## 2020-05-22 PROCEDURE — 99223 1ST HOSP IP/OBS HIGH 75: CPT | Mod: ,,, | Performed by: HOSPITALIST

## 2020-05-22 PROCEDURE — 70543 MRI ORBITS W W/O CONTRAST: ICD-10-PCS | Mod: 26,,, | Performed by: RADIOLOGY

## 2020-05-22 PROCEDURE — 99223 PR INITIAL HOSPITAL CARE,LEVL III: ICD-10-PCS | Mod: ,,, | Performed by: HOSPITALIST

## 2020-05-22 PROCEDURE — 70543 MRI ORBT/FAC/NCK W/O &W/DYE: CPT | Mod: TC

## 2020-05-22 PROCEDURE — 20600001 HC STEP DOWN PRIVATE ROOM

## 2020-05-22 PROCEDURE — 63600175 PHARM REV CODE 636 W HCPCS: Performed by: HOSPITALIST

## 2020-05-22 PROCEDURE — 70543 MRI ORBT/FAC/NCK W/O &W/DYE: CPT | Mod: 26,,, | Performed by: RADIOLOGY

## 2020-05-22 RX ORDER — SODIUM CHLORIDE 0.9 % (FLUSH) 0.9 %
10 SYRINGE (ML) INJECTION
Status: DISCONTINUED | OUTPATIENT
Start: 2020-05-22 | End: 2020-05-23 | Stop reason: HOSPADM

## 2020-05-22 RX ORDER — IPRATROPIUM BROMIDE AND ALBUTEROL SULFATE 2.5; .5 MG/3ML; MG/3ML
3 SOLUTION RESPIRATORY (INHALATION) EVERY 6 HOURS PRN
Status: DISCONTINUED | OUTPATIENT
Start: 2020-05-22 | End: 2020-05-23 | Stop reason: HOSPADM

## 2020-05-22 RX ORDER — IBUPROFEN 200 MG
24 TABLET ORAL
Status: DISCONTINUED | OUTPATIENT
Start: 2020-05-22 | End: 2020-05-23 | Stop reason: SDUPTHER

## 2020-05-22 RX ORDER — TALC
6 POWDER (GRAM) TOPICAL NIGHTLY PRN
Status: DISCONTINUED | OUTPATIENT
Start: 2020-05-22 | End: 2020-05-23 | Stop reason: HOSPADM

## 2020-05-22 RX ORDER — LISINOPRIL 20 MG/1
20 TABLET ORAL DAILY
Status: DISCONTINUED | OUTPATIENT
Start: 2020-05-23 | End: 2020-05-23 | Stop reason: HOSPADM

## 2020-05-22 RX ORDER — ENOXAPARIN SODIUM 100 MG/ML
40 INJECTION SUBCUTANEOUS EVERY 24 HOURS
Status: DISCONTINUED | OUTPATIENT
Start: 2020-05-22 | End: 2020-05-23 | Stop reason: HOSPADM

## 2020-05-22 RX ORDER — ONDANSETRON 2 MG/ML
4 INJECTION INTRAMUSCULAR; INTRAVENOUS EVERY 8 HOURS PRN
Status: DISCONTINUED | OUTPATIENT
Start: 2020-05-22 | End: 2020-05-23 | Stop reason: HOSPADM

## 2020-05-22 RX ORDER — ACETAMINOPHEN 325 MG/1
650 TABLET ORAL EVERY 4 HOURS PRN
Status: DISCONTINUED | OUTPATIENT
Start: 2020-05-22 | End: 2020-05-23 | Stop reason: HOSPADM

## 2020-05-22 RX ORDER — PROCHLORPERAZINE EDISYLATE 5 MG/ML
5 INJECTION INTRAMUSCULAR; INTRAVENOUS EVERY 6 HOURS PRN
Status: DISCONTINUED | OUTPATIENT
Start: 2020-05-22 | End: 2020-05-23 | Stop reason: HOSPADM

## 2020-05-22 RX ORDER — IBUPROFEN 200 MG
16 TABLET ORAL
Status: DISCONTINUED | OUTPATIENT
Start: 2020-05-22 | End: 2020-05-23 | Stop reason: SDUPTHER

## 2020-05-22 RX ADMIN — ENOXAPARIN SODIUM 40 MG: 40 INJECTION SUBCUTANEOUS at 09:05

## 2020-05-22 NOTE — PROVIDER TRANSFER
[facility] admissions ONLY: Please call extension [phone] upon patient arrival to floor for Hospital Medicine admit team assignment and for additional admit orders for the patient. Do not page the attending, staff physician or Advanced Practice Provider with the patient on arrival (may not be in-house at the time of arrival). Call back or wait to leave beeper number when prompted.     (Physician in Lead of Transfers)/Regional Referral Center Note    Patients name/MRN: Jeb Stuart/MRN [id number]    Referring Physician or Mid-Level provider giving report: RIGOBERTO Morel (Emergency Medicine)  Contact number: [phone]    Referral Facility: [facility] in [place], [place]    Date/Time of Acceptance: 5/22/2020 2:33 PM    : Dr. Sangeetha Dowling (Hospital Medicine)    Accepting facility [facility] Med[facility]    Consulting Physicians from [facility] involved in case:  Dr. Dipak Villa ([facility]-General Neurology)    Reason for transfer request: Needs Neurology for bilateral optic neuritis and concern for multiple sclerosis     Report from Physician/Mid-Level Provider/HPI: 33 y/o female with morbid obesity, essential HTN and HLP, prior episode of optic neuritis in 2013 treated with steroids presented to [facility] ED yesterday with 3 day history of decreased vision and blurry vision to right eye. Ophthalmology was called last night and recommended MRI of orbits due to her prior history of optic neuritis but could not be done until this am as had to go to [facility] for MRI as too large for [facility]. MRI of orbits with and without contrast showed asymmetric increased signal within the bilateral optic nerves, right greater than left, with some enhancement identified within the right optic nerve on the postcontrast images, these images somewhat limited given significant motion on the examination.  Additionally, there are 3 foci of T2/FLAIR signal abnormality in the supratentorial  white matter, primarily in a periventricular distribution, with the region of signal abnormality adjacent to the posterior horn of the left lateral ventricle demonstrating lesional enhancement.  Findings are concerning for a demyelinating disease process, such as multiple sclerosis with bilateral optic neuritis (right greater than left), with active demyelination involving the lesion adjacent to the posterior horn of the left lateral ventricle. Patient seen by Ophthalmology this am and recommended Solumedrol 1 gram IV daily for 3 days and labs for TOM, ACE, lysozyme, FTA-ABS, ESR/CRP, QuantiFERON gold, aq4 IgG. Labs sent and are pending and patient given dose of IV Solumedrol in Ed. Ophthalmology recommending Neuro consult for possible MS and no neurology at [facility]. Dr. Villa from [facility] called and stated patient needed LP and panel for MS sent of CSF and neuro evaluation for which they could do tele Neuro consult but Hospital medicine uncomfortable about admitting patient so patient to be transferred for LP and Neurology consult and evaluation.    VS: /85  Pulse 98   Temp 98.7 °F (37.1 °C) (Oral)   Resp 15   Wt 163.7 kg (360 lb 14.4 oz)   SpO2 99% on room air  BMI 53.30 kg/m²     Lines/Tubes:  Peripheral line ? Type: 20 gauge Location: Right Antecubital    Past Medical/Surgical History: See EPIC    Meds: See EPIC    Labs: See EPIC    Imaging: See EPIC    To Do List upon arrival:     Continue IV Solumedrol 1 gram daily for 2 more days (First dose today, 5/22)   Consult General Neurology for MS evaluation     Sangeetha Dowling MD  Senior Staff Physician  Department of Hospital Medicine  Patient Flow Center/   [phone]

## 2020-05-23 VITALS
RESPIRATION RATE: 16 BRPM | OXYGEN SATURATION: 98 % | BODY MASS INDEX: 43.4 KG/M2 | SYSTOLIC BLOOD PRESSURE: 128 MMHG | HEART RATE: 115 BPM | TEMPERATURE: 97 F | HEIGHT: 69 IN | WEIGHT: 293 LBS | DIASTOLIC BLOOD PRESSURE: 63 MMHG

## 2020-05-23 PROBLEM — G37.9 DEMYELINATING CHANGES IN BRAIN: Status: ACTIVE | Noted: 2020-05-23

## 2020-05-23 LAB
ANION GAP SERPL CALC-SCNC: 12 MMOL/L (ref 8–16)
BASOPHILS # BLD AUTO: 0.03 K/UL (ref 0–0.2)
BASOPHILS NFR BLD: 0.2 % (ref 0–1.9)
BUN SERPL-MCNC: 13 MG/DL (ref 6–20)
CALCIUM SERPL-MCNC: 9.7 MG/DL (ref 8.7–10.5)
CHLORIDE SERPL-SCNC: 107 MMOL/L (ref 95–110)
CO2 SERPL-SCNC: 16 MMOL/L (ref 23–29)
CREAT SERPL-MCNC: 0.9 MG/DL (ref 0.5–1.4)
DIFFERENTIAL METHOD: ABNORMAL
EOSINOPHIL # BLD AUTO: 0 K/UL (ref 0–0.5)
EOSINOPHIL NFR BLD: 0 % (ref 0–8)
ERYTHROCYTE [DISTWIDTH] IN BLOOD BY AUTOMATED COUNT: 13.2 % (ref 11.5–14.5)
EST. GFR  (AFRICAN AMERICAN): >60 ML/MIN/1.73 M^2
EST. GFR  (NON AFRICAN AMERICAN): >60 ML/MIN/1.73 M^2
GLUCOSE SERPL-MCNC: 255 MG/DL (ref 70–110)
HCT VFR BLD AUTO: 46.5 % (ref 37–48.5)
HGB BLD-MCNC: 13.6 G/DL (ref 12–16)
IMM GRANULOCYTES # BLD AUTO: 0.1 K/UL (ref 0–0.04)
IMM GRANULOCYTES NFR BLD AUTO: 0.8 % (ref 0–0.5)
LYMPHOCYTES # BLD AUTO: 0.9 K/UL (ref 1–4.8)
LYMPHOCYTES NFR BLD: 7.3 % (ref 18–48)
MCH RBC QN AUTO: 29.4 PG (ref 27–31)
MCHC RBC AUTO-ENTMCNC: 29.2 G/DL (ref 32–36)
MCV RBC AUTO: 101 FL (ref 82–98)
MONOCYTES # BLD AUTO: 0.2 K/UL (ref 0.3–1)
MONOCYTES NFR BLD: 1.6 % (ref 4–15)
NEUTROPHILS # BLD AUTO: 11.5 K/UL (ref 1.8–7.7)
NEUTROPHILS NFR BLD: 90.1 % (ref 38–73)
NRBC BLD-RTO: 0 /100 WBC
PLATELET # BLD AUTO: 264 K/UL (ref 150–350)
PMV BLD AUTO: 11 FL (ref 9.2–12.9)
POCT GLUCOSE: 186 MG/DL (ref 70–110)
POTASSIUM SERPL-SCNC: 5.1 MMOL/L (ref 3.5–5.1)
RBC # BLD AUTO: 4.62 M/UL (ref 4–5.4)
SODIUM SERPL-SCNC: 135 MMOL/L (ref 136–145)
WBC # BLD AUTO: 12.81 K/UL (ref 3.9–12.7)

## 2020-05-23 PROCEDURE — 25000003 PHARM REV CODE 250: Performed by: HOSPITALIST

## 2020-05-23 PROCEDURE — 80048 BASIC METABOLIC PNL TOTAL CA: CPT

## 2020-05-23 PROCEDURE — 99233 PR SUBSEQUENT HOSPITAL CARE,LEVL III: ICD-10-PCS | Mod: ,,, | Performed by: PSYCHIATRY & NEUROLOGY

## 2020-05-23 PROCEDURE — 63600175 PHARM REV CODE 636 W HCPCS: Performed by: HOSPITALIST

## 2020-05-23 PROCEDURE — 85025 COMPLETE CBC W/AUTO DIFF WBC: CPT

## 2020-05-23 PROCEDURE — 25000003 PHARM REV CODE 250: Performed by: INTERNAL MEDICINE

## 2020-05-23 PROCEDURE — 36415 COLL VENOUS BLD VENIPUNCTURE: CPT

## 2020-05-23 PROCEDURE — 25500020 PHARM REV CODE 255: Performed by: INTERNAL MEDICINE

## 2020-05-23 PROCEDURE — A9585 GADOBUTROL INJECTION: HCPCS | Performed by: INTERNAL MEDICINE

## 2020-05-23 PROCEDURE — 99239 PR HOSPITAL DISCHARGE DAY,>30 MIN: ICD-10-PCS | Mod: ,,, | Performed by: INTERNAL MEDICINE

## 2020-05-23 PROCEDURE — 99233 SBSQ HOSP IP/OBS HIGH 50: CPT | Mod: ,,, | Performed by: PSYCHIATRY & NEUROLOGY

## 2020-05-23 PROCEDURE — 99239 HOSP IP/OBS DSCHRG MGMT >30: CPT | Mod: ,,, | Performed by: INTERNAL MEDICINE

## 2020-05-23 PROCEDURE — 94761 N-INVAS EAR/PLS OXIMETRY MLT: CPT

## 2020-05-23 RX ORDER — LORAZEPAM 0.5 MG/1
0.5 TABLET ORAL
Status: DISCONTINUED | OUTPATIENT
Start: 2020-05-23 | End: 2020-05-23 | Stop reason: HOSPADM

## 2020-05-23 RX ORDER — INSULIN ASPART 100 [IU]/ML
0-5 INJECTION, SOLUTION INTRAVENOUS; SUBCUTANEOUS
Status: DISCONTINUED | OUTPATIENT
Start: 2020-05-23 | End: 2020-05-23 | Stop reason: HOSPADM

## 2020-05-23 RX ORDER — GLUCAGON 1 MG
1 KIT INJECTION
Status: DISCONTINUED | OUTPATIENT
Start: 2020-05-23 | End: 2020-05-23 | Stop reason: HOSPADM

## 2020-05-23 RX ORDER — IBUPROFEN 200 MG
24 TABLET ORAL
Status: DISCONTINUED | OUTPATIENT
Start: 2020-05-23 | End: 2020-05-23 | Stop reason: HOSPADM

## 2020-05-23 RX ORDER — GADOBUTROL 604.72 MG/ML
10 INJECTION INTRAVENOUS
Status: COMPLETED | OUTPATIENT
Start: 2020-05-23 | End: 2020-05-23

## 2020-05-23 RX ORDER — IBUPROFEN 200 MG
16 TABLET ORAL
Status: DISCONTINUED | OUTPATIENT
Start: 2020-05-23 | End: 2020-05-23 | Stop reason: HOSPADM

## 2020-05-23 RX ORDER — METHYLPREDNISOLONE SODIUM SUCCINATE 1 G/16ML
1000 INJECTION INTRAMUSCULAR; INTRAVENOUS DAILY
Qty: 1 VIAL | Refills: 0 | Status: SHIPPED | OUTPATIENT
Start: 2020-05-24 | End: 2020-05-25

## 2020-05-23 RX ORDER — HYDRALAZINE HYDROCHLORIDE 25 MG/1
25 TABLET, FILM COATED ORAL EVERY 8 HOURS PRN
Status: DISCONTINUED | OUTPATIENT
Start: 2020-05-23 | End: 2020-05-23 | Stop reason: HOSPADM

## 2020-05-23 RX ADMIN — GADOBUTROL 10 ML: 604.72 INJECTION INTRAVENOUS at 02:05

## 2020-05-23 RX ADMIN — LISINOPRIL 20 MG: 20 TABLET ORAL at 08:05

## 2020-05-23 RX ADMIN — LORAZEPAM 0.5 MG: 0.5 TABLET ORAL at 01:05

## 2020-05-23 RX ADMIN — ACETAMINOPHEN 650 MG: 325 TABLET ORAL at 03:05

## 2020-05-23 RX ADMIN — DEXTROSE: 50 INJECTION, SOLUTION INTRAVENOUS at 08:05

## 2020-05-23 NOTE — PLAN OF CARE
Discharge instructions and prescriptions given and explained to pt.  Pt. verbalized understanding with no further questions.  Peripheral IV D/C'd with catheter tip intact.  VS WDL.  Patient is awaiting transport and leaving with friends.    ROAD TEST  O=SpO2 96% on RA  A=Ambulating around room and hallway  D=IV D/C'd  T=Tolerating regular diet  E=Voids  S=Performs self care independently  T=Teaching on wounds care complete N/A

## 2020-05-23 NOTE — ASSESSMENT & PLAN NOTE
35 y/o D with a medical history of HTN, HLD, migraines (on topamax) and a BMI of 53.29 presents to the ED as a transfer from [facility] for further evaluation of her optic neuritis in the setting of abnormal MRI changes. Patient has a prior episode of ON (in her right eye) in 11/17/2015 which was resolved with IV steroids. Ms. Stuart was required to follow up with neurology as an outpatient at that time but her physician never followed up with her. At that time she did not get an LP or get further imaging of her neuronal axis.Since her first episode of vision loss, patient states that she gets intermittent vision loss in the right eye that worsens when she gets headaches. Vision improves when her headache resolves. Since last Saturday patient has been having consistent blurry vision in all fields of her right eye that is not associated with any extremity weakness. MRI revealed asymmetric increased signal in the optic nerves (R>L) with abnormal T2 signal abnormalities in the periventricular region.     Differential Diagnosis: Given dissemination in space and time patient would fulfill the  Collin criteria for MS. However, MS mimics cannot be ruled out. As patient's BMI is 53.29 will get LP as outpatient with IR to evaluate all associated Csf studies     Plan  1) Plan for 2 more doses of 1000 mg IV solumedrol as patient received one dose prior to transfer  2) Imaging of C and T spine w and w/o contrast   3) LP with IR due to body habitus   4) Will schedule patient to be seen in MS clinic     Serum studies:   ---- Copper and ceruloplasmin / B12, MMA / TSH, Free T3, T4, 25-hydroxyvitamin D / heavy metals   ---- NMO-IgG / MS panel / Paraneoplastic panel   ---- Systemic autoimmune disorders: ds-dna / SSA, SSD / ACE / TOM, RF, ESR, CRP,   ---- Para infectious: HIV antibodies / VDRL, RPR / Lymes / TB / hepatitis B, hepatitis C    Cerebrospinal fluid (CSF)  ---- Cells, differential, protein, glucose,   ---- NMO-IgG / MS panel  / Paraneoplastic panel   ---- ACE   ---- Cytology
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
## 2020-05-23 NOTE — HPI
"33 yo F with PMHx morbid obesity, HTN, HLD, and prior episode of optic neuritis in 2013 treated with steroids presented to [facility] ED yesterday with 3 day history of decreased vision and blurry vision to right eye. The patient reports that since 2013 she has had issues with the eye "on and off". She states that she occasionally has blurred vision in the eye that lasts a few hours but will eventually return to normal. On the Tuesday prior to admit, she reports developing blurred vision and some pressure like pain in her R eye. She reports that the pain went away after some time, but the blurred vision has persisted and gotten worse over the last few days. She has never had it last this long before. She denies any pain with movement with the R eye. She also denies any issues with her L eye, focal weakness, numbness, fevers, chills, or headaches.     Hospital Course per :   "Ophthalmology was called last night and recommended MRI of orbits due to her prior history of optic neuritis but could not be done until this am as had to go to [facility] for MRI as too large for [facility]. MRI of orbits with and without contrast showed asymmetric increased signal within the bilateral optic nerves, right greater than left, with some enhancement identified within the right optic nerve on the postcontrast images, these images somewhat limited given significant motion on the examination.  Additionally, there are 3 foci of T2/FLAIR signal abnormality in the supratentorial white matter, primarily in a periventricular distribution, with the region of signal abnormality adjacent to the posterior horn of the left lateral ventricle demonstrating lesional enhancement.  Findings are concerning for a demyelinating disease process, such as multiple sclerosis with bilateral optic neuritis (right greater than left), with active demyelination involving the lesion adjacent to the posterior horn of the left lateral ventricle. " "Patient seen by Ophthalmology this am and recommended Solumedrol 1 gram IV daily for 3 days and labs for TOM, ACE, lysozyme, FTA-ABS, ESR/CRP, QuantiFERON gold, aq4 IgG. Labs sent and are pending and patient given dose of IV Solumedrol in Ed. Ophthalmology recommending Neuro consult for possible MS and no neurology at [facility]. Dr. Villa from [facility] called and stated patient needed LP and panel for MS sent of CSF and neuro evaluation for which they could do tele Neuro consult but Hospital medicine uncomfortable about admitting patient so patient to be transferred for LP and Neurology consult and evaluation."  "

## 2020-05-23 NOTE — PLAN OF CARE
Plan of care reviewed with the patient upon admission to the unit. The patient is alert and oriented. GCS 15. Complaining of blurred vision. Denying all other complaints. Remained free from falls and injuries throughout shift. Independent and ambulatory. VSS. Bed in low locked position. Call bell and personal items within reach. Will continue to monitor.

## 2020-05-23 NOTE — NURSING TRANSFER
Nursing Transfer Note           Transfer Willow to [facility] room 811    Transfer via St[facility]    Transfer with personal belongings    Transported by EMS    Medicines sent: N/A    Chart send with patient: In epic    Notified: On call MD    Patient reassessed at: 19:50 5/22/2020    Upon arrival to floor: The was found to be alert and oriented. GCS 15. Ambulatory and independent. Complaining of blurred vision. VSS. Admission completed. MD notified. Bed in low locked position. Call bell and personal items within reach. Will continue to monitor.

## 2020-05-23 NOTE — SUBJECTIVE & OBJECTIVE
Past Medical History:   Diagnosis Date    Dysmenorrhea     Essential hypertension 2020    Excessive menses     Excessive or frequent menstruation 2018    HLD (hyperlipidemia)     Morbid obesity with BMI of 50.0-59.9, adult 2020    Wears glasses        Past Surgical History:   Procedure Laterality Date    ABDOMINAL SURGERY      gastric sleeve     SECTION      X 2    ESOPHAGOGASTRODUODENOSCOPY      HERNIA REPAIR      TONSILLECTOMY         Review of patient's allergies indicates:  No Known Allergies    Current Neurological Medications:     Current Facility-Administered Medications on File Prior to Encounter   Medication    [COMPLETED] acetaminophen tablet 1,000 mg    [COMPLETED] diphenhydrAMINE injection 25 mg    [COMPLETED] prochlorperazine injection Soln 10 mg     Current Outpatient Medications on File Prior to Encounter   Medication Sig    lisinopril (PRINIVIL,ZESTRIL) 20 MG tablet Take 20 mg by mouth every morning.     albuterol (PROVENTIL/VENTOLIN HFA) 90 mcg/actuation inhaler Inhale 2 puffs into the lungs every 4 (four) hours as needed for Wheezing. Rescue    ibuprofen (ADVIL,MOTRIN) 800 MG tablet Take 1 tablet (800 mg total) by mouth every 6 (six) hours as needed for Pain.    ondansetron (ZOFRAN-ODT) 8 MG TbDL Take 1 tablet (8 mg total) by mouth every 6 (six) hours as needed.     Family History     Problem Relation (Age of Onset)    COPD Father    Hypertension Mother, Father        Tobacco Use    Smoking status: Never Smoker    Smokeless tobacco: Never Used   Substance and Sexual Activity    Alcohol use: No    Drug use: No    Sexual activity: Yes     Partners: Male     Birth control/protection: None     Review of Systems   Constitutional: Negative for activity change, appetite change, chills and diaphoresis.   Eyes: Positive for visual disturbance. Negative for discharge and itching.   Respiratory: Negative for apnea and chest tightness.    Cardiovascular: Negative  for chest pain and leg swelling.   Gastrointestinal: Negative for abdominal distention and abdominal pain.   Neurological: Negative for dizziness, facial asymmetry, light-headedness and headaches.   Psychiatric/Behavioral: Negative for agitation, behavioral problems, confusion and decreased concentration.     Objective:     Vital Signs (Most Recent):  Temp: 98.5 °F (36.9 °C) (05/23/20 1055)  Pulse: (!) 126 (05/23/20 1219)  Resp: 18 (05/23/20 1055)  BP: (!) 119/59 (05/23/20 1055)  SpO2: 99 % (05/23/20 1219) Vital Signs (24h Range):  Temp:  [97.7 °F (36.5 °C)-99.6 °F (37.6 °C)] 98.5 °F (36.9 °C)  Pulse:  [] 126  Resp:  [16-18] 18  SpO2:  [96 %-99 %] 99 %  BP: (119-163)/(59-90) 119/59     Weight: (!) 163.7 kg (360 lb 14.3 oz)  Body mass index is 53.29 kg/m².    Physical Exam   Constitutional: She is oriented to person, place, and time.   Eyes: Pupils are equal, round, and reactive to light. EOM are normal.   Neurological: She is oriented to person, place, and time. She has normal strength.   Reflex Scores:       Tricep reflexes are 2+ on the right side and 2+ on the left side.       Bicep reflexes are 2+ on the right side and 2+ on the left side.       Brachioradialis reflexes are 2+ on the right side and 2+ on the left side.       Patellar reflexes are 2+ on the right side and 2+ on the left side.       Achilles reflexes are 2+ on the right side and 2+ on the left side.  Psychiatric: Her speech is normal.       NEUROLOGICAL EXAMINATION:     MENTAL STATUS   Oriented to person, place, and time.   Attention: normal.   Speech: speech is normal   Level of consciousness: alert    CRANIAL NERVES     CN II   Right visual field deficit: upper nasal, lower nasal, upper temporal and lower temporal quadrant(s)    CN III, IV, VI   Pupils are equal, round, and reactive to light.  Extraocular motions are normal.     CN V   Facial sensation intact.     CN VII   Facial expression full, symmetric.     CN VIII   CN VIII normal.      CN XI   CN XI normal.     CN XII   CN XII normal.     MOTOR EXAM   Muscle bulk: normal  Overall muscle tone: normal  Right arm tone: normal  Left arm tone: normal  Right arm pronator drift: absent  Left arm pronator drift: absent  Right leg tone: normal  Left leg tone: normal    Strength   Strength 5/5 throughout.     REFLEXES     Reflexes   Right brachioradialis: 2+  Left brachioradialis: 2+  Right biceps: 2+  Left biceps: 2+  Right triceps: 2+  Left triceps: 2+  Right patellar: 2+  Left patellar: 2+  Right achilles: 2+  Left achilles: 2+  Right plantar: normal  Left plantar: normal    SENSORY EXAM   Vibration normal.     GAIT AND COORDINATION        Deferred        Significant Labs:   BMP:   Recent Labs   Lab 05/21/20  1545 05/23/20  0615   GLU 79 255*    135*   K 4.3 5.1    107   CO2 25 16*   BUN 13 13   CREATININE 0.8 0.9   CALCIUM 9.2 9.7     CBC:   Recent Labs   Lab 05/21/20  1545 05/23/20  0615   WBC 7.97 12.81*   HGB 13.0 13.6   HCT 40.0 46.5    264     CMP:   Recent Labs   Lab 05/21/20  1545 05/23/20  0615   GLU 79 255*    135*   K 4.3 5.1    107   CO2 25 16*   BUN 13 13   CREATININE 0.8 0.9   CALCIUM 9.2 9.7   ANIONGAP 10 12   EGFRNONAA >60.0 >60.0       Significant Imaging: I have reviewed all pertinent imaging results/findings within the past 24 hours.

## 2020-05-23 NOTE — DISCHARGE SUMMARY
"[facility] Hospital Medicine  Discharge Summary      Patient Name: Jeb Stuart  MRN: [id number]  Admission Date: 5/22/2020  Hospital Length of Stay: 1 days  Discharge Date and Time:  05/23/2020 4:35 PM  Attending Physician: Porsche Vargas*   Discharging Provider: Posrche Vargas MD  Primary Care Provider: Zarina Coffman MD  Hospital Medicine Team: Networked reference to record PCT  Porsche Vargas MD    HPI:   35 yo F with PMHx morbid obesity, HTN, HLD, and prior episode of optic neuritis in 2013 treated with steroids presented to [facility] ED yesterday with 3 day history of decreased vision and blurry vision to right eye. The patient reports that since 2013 she has had issues with the eye "on and off". She states that she occasionally has blurred vision in the eye that lasts a few hours but will eventually return to normal. On the Tuesday prior to admit, she reports developing blurred vision and some pressure like pain in her R eye. She reports that the pain went away after some time, but the blurred vision has persisted and gotten worse over the last few days. She has never had it last this long before. She denies any pain with movement with the R eye. She also denies any issues with her L eye, focal weakness, numbness, fevers, chills, or headaches.     Hospital Course per :   "Ophthalmology was called last night and recommended MRI of orbits due to her prior history of optic neuritis but could not be done until this am as had to go to [facility] for MRI as too large for [facility]. MRI of orbits with and without contrast showed asymmetric increased signal within the bilateral optic nerves, right greater than left, with some enhancement identified within the right optic nerve on the postcontrast images, these images somewhat limited given significant motion on the examination.  Additionally, there are 3 foci of T2/FLAIR signal abnormality in " "the supratentorial white matter, primarily in a periventricular distribution, with the region of signal abnormality adjacent to the posterior horn of the left lateral ventricle demonstrating lesional enhancement.  Findings are concerning for a demyelinating disease process, such as multiple sclerosis with bilateral optic neuritis (right greater than left), with active demyelination involving the lesion adjacent to the posterior horn of the left lateral ventricle. Patient seen by Ophthalmology this am and recommended Solumedrol 1 gram IV daily for 3 days and labs for TOM, ACE, lysozyme, FTA-ABS, ESR/CRP, QuantiFERON gold, aq4 IgG. Labs sent and are pending and patient given dose of IV Solumedrol in Ed. Ophthalmology recommending Neuro consult for possible MS and no neurology at [facility]. Dr. Villa from [facility] called and stated patient needed LP and panel for MS sent of CSF and neuro evaluation for which they could do tele Neuro consult but Hospital medicine uncomfortable about admitting patient so patient to be transferred for LP and Neurology consult and evaluation."    * No surgery found *      Hospital Course:   Pt received her second dose of solumedrol 1gm IV (total of 3 ordered by ophtha) after arrival to [facility]. Evaluated by neurology who did not feel capable of performing a successful and safe bedside LP given large body habitus and determined pt stable for discharge following MRI spine with plan to follow-up in MS clinic (neuro team to schedule appt). Outpt LP ordered by neuro to be performed by IR. Pt underwent MRI without complications and was med stable discharge following study. Solumedrol smoothie (1gm) to be taken on 5/24 was delivered to bedside prior to discharge.     Consults:   Consults (From admission, onward)        Status Ordering Provider     Inpatient consult to Neurology  Once     Provider:  (Not yet assigned)    Completed HELGA ALVARADO          No new Assessment & Plan notes have been " filed under this hospital service since the last note was generated.  Service: Hospital Medicine    Final Active Diagnoses:    Diagnosis Date Noted POA    PRINCIPAL PROBLEM:  Bilateral optic neuritis [H46.9] 05/22/2020 Yes    Demyelinating changes in brain [G37.9] 05/23/2020 Unknown    HLD (hyperlipidemia) [E78.5]  Yes    Morbid obesity with BMI of 50.0-59.9, adult [E66.01, Z68.43] 02/24/2020 Not Applicable    Essential hypertension [I10] 02/24/2020 Yes      Problems Resolved During this Admission:       Discharged Condition: stable    Disposition: Home or Self Care    Follow Up:    Patient Instructions:      IR Lumbar Puncture Diagnostic   Standing Status: Future Standing Exp. Date: 08/23/20   Order Comments: Please schedule for outpatient lumbar puncture within one week for MS     Order Specific Question Answer Comments   Has the patient had a prior contrast or iodine reaction? No    Is the patient currently on any blood thinners like Aspirin, Coumadin, or Plavix? No    Is the patient on any Metformin drug, such as Glucophage or Glucovance? No    May the Radiologist modify the order per protocol to meet the clinical needs of the patient? Yes      CSF cell count with differential     Order Specific Question Answer Comments   Specimen Tube Number Tube 1 [1]      ACE, CSF     Order Specific Question Answer Comments   Specimen Tube Number Tube 1      Herpes Simplex (HSV) by PCR, CSF     Order Specific Question Answer Comments   Specimen Tube Number Tube 1      VDRL, CSF   Order Comments:       Ms Profile   Standing Status: Future Standing Exp. Date: 07/22/21     Ms Profile Blood Collection   Standing Status: Future Standing Exp. Date: 07/22/21     NMO AQUAPORIN-4-IGG, CSF     VITAMIN D   Standing Status: Future Standing Exp. Date: 07/22/21     HEAVY METALS SCREEN, BLOOD (QUANTITATIVE)   Standing Status: Future Standing Exp. Date: 07/22/21     VITAMIN B12   Standing Status: Future Standing Exp. Date: 07/22/21      VITAMIN B6   Standing Status: Future Standing Exp. Date: 07/22/21     VITAMIN E   Standing Status: Future Standing Exp. Date: 07/22/21     TSH   Standing Status: Future Standing Exp. Date: 07/22/21     MOG-IgG1   Standing Status: Future Standing Exp. Date: 07/22/21     Diet Cardiac     Notify your health care provider if you experience any of the following:  persistent dizziness, light-headedness, or visual disturbances     Notify your health care provider if you experience any of the following:  increased confusion or weakness       Significant Diagnostic Studies: Labs: All labs within the past 24 hours have been reviewed    Pending Diagnostic Studies:     None         Medications:  Reconciled Home Medications:      Medication List      START taking these medications    methylPREDNISolone sodium succinate 1,000 mg injection  Commonly known as:  SOLU-MEDROL  Mix 1,000 mg totalinto smoothie once daily for 1 day  Start taking on:  May 24, 2020        CONTINUE taking these medications    albuterol 90 mcg/actuation inhaler  Commonly known as:  PROVENTIL/VENTOLIN HFA  Inhale 2 puffs into the lungs every 4 (four) hours as needed for Wheezing. Rescue     ibuprofen 800 MG tablet  Commonly known as:  ADVIL,MOTRIN  Take 1 tablet (800 mg total) by mouth every 6 (six) hours as needed for Pain.     lisinopriL 20 MG tablet  Commonly known as:  PRINIVIL,ZESTRIL  Take 20 mg by mouth every morning.        ASK your doctor about these medications    ondansetron 8 MG Tbdl  Commonly known as:  ZOFRAN-ODT  Take 1 tablet (8 mg total) by mouth every 6 (six) hours as needed.            Indwelling Lines/Drains at time of discharge:   Lines/Drains/Airways     None                 Time spent on the discharge of patient: 35 minutes  Patient was seen and examined on the date of discharge and determined to be suitable for discharge.         Porsche Vargas MD  Department of Hospital Medicine  [facility]

## 2020-05-23 NOTE — HOSPITAL COURSE
Pt received her second dose of solumedrol 1gm IV (total of 3 ordered by ophtha) after arrival to [facility]. Evaluated by neurology who did not feel capable of performing a successful and safe bedside LP given large body habitus and determined pt stable for discharge following MRI spine with plan to follow-up in MS clinic (neuro team to schedule appt). Outpt LP ordered by neuro to be performed by IR. Pt underwent MRI without complications and was med stable discharge following study. Solumedrol smoothie (1gm) to be taken on 5/24 was delivered to bedside prior to discharge.

## 2020-05-25 ENCOUNTER — TELEPHONE (OUTPATIENT)
Dept: NEUROLOGY | Facility: CLINIC | Age: 35
End: 2020-05-25

## 2020-05-25 DIAGNOSIS — H46.9 OPTIC NEURITIS: Primary | ICD-10-CM

## 2020-09-16 ENCOUNTER — LAB VISIT (OUTPATIENT)
Dept: PRIMARY CARE CLINIC | Facility: OTHER | Age: 35
End: 2020-09-16
Attending: INTERNAL MEDICINE
Payer: MEDICAID

## 2020-09-16 DIAGNOSIS — Z03.818 ENCOUNTER FOR OBSERVATION FOR SUSPECTED EXPOSURE TO OTHER BIOLOGICAL AGENTS RULED OUT: ICD-10-CM

## 2020-09-16 LAB — SARS-COV-2 RNA RESP QL NAA+PROBE: NOT DETECTED

## 2020-09-16 PROCEDURE — U0003 INFECTIOUS AGENT DETECTION BY NUCLEIC ACID (DNA OR RNA); SEVERE ACUTE RESPIRATORY SYNDROME CORONAVIRUS 2 (SARS-COV-2) (CORONAVIRUS DISEASE [COVID-19]), AMPLIFIED PROBE TECHNIQUE, MAKING USE OF HIGH THROUGHPUT TECHNOLOGIES AS DESCRIBED BY CMS-2020-01-R: HCPCS

## 2021-05-27 ENCOUNTER — TELEPHONE (OUTPATIENT)
Dept: GYNECOLOGIC ONCOLOGY | Facility: CLINIC | Age: 36
End: 2021-05-27

## 2021-05-28 ENCOUNTER — TELEPHONE (OUTPATIENT)
Dept: GYNECOLOGIC ONCOLOGY | Facility: CLINIC | Age: 36
End: 2021-05-28

## 2021-06-02 ENCOUNTER — OFFICE VISIT (OUTPATIENT)
Dept: GYNECOLOGIC ONCOLOGY | Facility: CLINIC | Age: 36
End: 2021-06-02
Payer: MEDICAID

## 2021-06-02 ENCOUNTER — LAB VISIT (OUTPATIENT)
Dept: LAB | Facility: OTHER | Age: 36
End: 2021-06-02
Attending: OBSTETRICS & GYNECOLOGY
Payer: MEDICAID

## 2021-06-02 VITALS
SYSTOLIC BLOOD PRESSURE: 122 MMHG | DIASTOLIC BLOOD PRESSURE: 66 MMHG | WEIGHT: 293 LBS | HEART RATE: 65 BPM | BODY MASS INDEX: 60.2 KG/M2

## 2021-06-02 DIAGNOSIS — N92.0 EXCESSIVE OR FREQUENT MENSTRUATION: Primary | ICD-10-CM

## 2021-06-02 DIAGNOSIS — E66.01 CLASS 3 SEVERE OBESITY WITHOUT SERIOUS COMORBIDITY WITH BODY MASS INDEX (BMI) OF 60.0 TO 69.9 IN ADULT, UNSPECIFIED OBESITY TYPE: ICD-10-CM

## 2021-06-02 DIAGNOSIS — N92.0 EXCESSIVE OR FREQUENT MENSTRUATION: ICD-10-CM

## 2021-06-02 LAB
ALBUMIN SERPL BCP-MCNC: 3.8 G/DL (ref 3.5–5.2)
ALP SERPL-CCNC: 67 U/L (ref 55–135)
ALT SERPL W/O P-5'-P-CCNC: 23 U/L (ref 10–44)
ANION GAP SERPL CALC-SCNC: 10 MMOL/L (ref 8–16)
AST SERPL-CCNC: 16 U/L (ref 10–40)
BASOPHILS # BLD AUTO: 0.03 K/UL (ref 0–0.2)
BASOPHILS NFR BLD: 0.4 % (ref 0–1.9)
BILIRUB SERPL-MCNC: 0.5 MG/DL (ref 0.1–1)
BUN SERPL-MCNC: 14 MG/DL (ref 6–20)
CALCIUM SERPL-MCNC: 9.4 MG/DL (ref 8.7–10.5)
CHLORIDE SERPL-SCNC: 108 MMOL/L (ref 95–110)
CO2 SERPL-SCNC: 22 MMOL/L (ref 23–29)
CREAT SERPL-MCNC: 0.8 MG/DL (ref 0.5–1.4)
DIFFERENTIAL METHOD: NORMAL
EOSINOPHIL # BLD AUTO: 0.2 K/UL (ref 0–0.5)
EOSINOPHIL NFR BLD: 3.3 % (ref 0–8)
ERYTHROCYTE [DISTWIDTH] IN BLOOD BY AUTOMATED COUNT: 13.1 % (ref 11.5–14.5)
EST. GFR  (AFRICAN AMERICAN): >60 ML/MIN/1.73 M^2
EST. GFR  (NON AFRICAN AMERICAN): >60 ML/MIN/1.73 M^2
GLUCOSE SERPL-MCNC: 90 MG/DL (ref 70–110)
HCT VFR BLD AUTO: 43.4 % (ref 37–48.5)
HGB BLD-MCNC: 13.9 G/DL (ref 12–16)
IMM GRANULOCYTES # BLD AUTO: 0.03 K/UL (ref 0–0.04)
IMM GRANULOCYTES NFR BLD AUTO: 0.4 % (ref 0–0.5)
LYMPHOCYTES # BLD AUTO: 2.7 K/UL (ref 1–4.8)
LYMPHOCYTES NFR BLD: 38.8 % (ref 18–48)
MCH RBC QN AUTO: 29.6 PG (ref 27–31)
MCHC RBC AUTO-ENTMCNC: 32 G/DL (ref 32–36)
MCV RBC AUTO: 92 FL (ref 82–98)
MONOCYTES # BLD AUTO: 0.5 K/UL (ref 0.3–1)
MONOCYTES NFR BLD: 6.8 % (ref 4–15)
NEUTROPHILS # BLD AUTO: 3.5 K/UL (ref 1.8–7.7)
NEUTROPHILS NFR BLD: 50.3 % (ref 38–73)
NRBC BLD-RTO: 0 /100 WBC
PLATELET # BLD AUTO: 287 K/UL (ref 150–450)
PMV BLD AUTO: 11.5 FL (ref 9.2–12.9)
POTASSIUM SERPL-SCNC: 4.2 MMOL/L (ref 3.5–5.1)
PROT SERPL-MCNC: 7.4 G/DL (ref 6–8.4)
RBC # BLD AUTO: 4.7 M/UL (ref 4–5.4)
SODIUM SERPL-SCNC: 140 MMOL/L (ref 136–145)
WBC # BLD AUTO: 6.96 K/UL (ref 3.9–12.7)

## 2021-06-02 PROCEDURE — 80053 COMPREHEN METABOLIC PANEL: CPT | Performed by: OBSTETRICS & GYNECOLOGY

## 2021-06-02 PROCEDURE — 99205 PR OFFICE/OUTPT VISIT, NEW, LEVL V, 60-74 MIN: ICD-10-PCS | Mod: S$PBB,,, | Performed by: OBSTETRICS & GYNECOLOGY

## 2021-06-02 PROCEDURE — 36415 COLL VENOUS BLD VENIPUNCTURE: CPT | Performed by: OBSTETRICS & GYNECOLOGY

## 2021-06-02 PROCEDURE — 99999 PR PBB SHADOW E&M-EST. PATIENT-LVL III: ICD-10-PCS | Mod: PBBFAC,,, | Performed by: OBSTETRICS & GYNECOLOGY

## 2021-06-02 PROCEDURE — 99205 OFFICE O/P NEW HI 60 MIN: CPT | Mod: S$PBB,,, | Performed by: OBSTETRICS & GYNECOLOGY

## 2021-06-02 PROCEDURE — 99213 OFFICE O/P EST LOW 20 MIN: CPT | Mod: PBBFAC | Performed by: OBSTETRICS & GYNECOLOGY

## 2021-06-02 PROCEDURE — 85025 COMPLETE CBC W/AUTO DIFF WBC: CPT | Performed by: OBSTETRICS & GYNECOLOGY

## 2021-06-02 PROCEDURE — 99999 PR PBB SHADOW E&M-EST. PATIENT-LVL III: CPT | Mod: PBBFAC,,, | Performed by: OBSTETRICS & GYNECOLOGY

## 2021-06-02 RX ORDER — KETOCONAZOLE 20 MG/ML
SHAMPOO, SUSPENSION TOPICAL
COMMUNITY
Start: 2021-04-18 | End: 2023-12-01

## 2021-06-02 RX ORDER — MOMETASONE FUROATE 1 MG/ML
SOLUTION TOPICAL
COMMUNITY
Start: 2021-05-01 | End: 2021-06-02

## 2021-06-02 RX ORDER — KETOCONAZOLE 20 MG/G
CREAM TOPICAL
COMMUNITY
Start: 2021-04-18 | End: 2023-12-01

## 2021-06-02 RX ORDER — NORETHINDRONE ACETATE AND ETHINYL ESTRADIOL .02; 1 MG/1; MG/1
1 TABLET ORAL DAILY
Qty: 30 TABLET | Refills: 3 | Status: SHIPPED | OUTPATIENT
Start: 2021-06-02 | End: 2021-09-09

## 2021-06-02 RX ORDER — LINACLOTIDE 290 UG/1
290 CAPSULE, GELATIN COATED ORAL DAILY
COMMUNITY
Start: 2021-06-01

## 2021-06-07 ENCOUNTER — TELEPHONE (OUTPATIENT)
Dept: GYNECOLOGIC ONCOLOGY | Facility: CLINIC | Age: 36
End: 2021-06-07
Payer: MEDICAID

## 2021-06-07 ENCOUNTER — TELEPHONE (OUTPATIENT)
Dept: GYNECOLOGIC ONCOLOGY | Facility: CLINIC | Age: 36
End: 2021-06-07

## 2021-06-07 DIAGNOSIS — Z01.818 PRE-OP TESTING: Primary | ICD-10-CM

## 2021-06-07 DIAGNOSIS — N92.0 EXCESSIVE OR FREQUENT MENSTRUATION: ICD-10-CM

## 2021-06-07 NOTE — TELEPHONE ENCOUNTER
Pt tentatively schd for surg on 6/22 at [facility] [place], operated by [facility]. Pt aware surg may change due to insurance approving Mirena. Surg instructions mailed to pt's home address on file along with pre admit appt. Covid screening schd at [facility] on 6/19. KEHINDE IVAN

## 2021-06-16 ENCOUNTER — ANESTHESIA EVENT (OUTPATIENT)
Dept: SURGERY | Facility: OTHER | Age: 36
End: 2021-06-16
Payer: MEDICAID

## 2021-06-16 ENCOUNTER — HOSPITAL ENCOUNTER (OUTPATIENT)
Dept: PREADMISSION TESTING | Facility: OTHER | Age: 36
Discharge: HOME | End: 2021-06-16
Attending: OBSTETRICS & GYNECOLOGY
Payer: MEDICAID

## 2021-06-16 VITALS
DIASTOLIC BLOOD PRESSURE: 71 MMHG | HEART RATE: 68 BPM | BODY MASS INDEX: 47.09 KG/M2 | SYSTOLIC BLOOD PRESSURE: 143 MMHG | OXYGEN SATURATION: 99 % | TEMPERATURE: 97 F | HEIGHT: 66 IN | WEIGHT: 293 LBS

## 2021-06-16 RX ORDER — LIDOCAINE HYDROCHLORIDE 10 MG/ML
0.5 INJECTION, SOLUTION EPIDURAL; INFILTRATION; INTRACAUDAL; PERINEURAL ONCE
Status: CANCELLED | OUTPATIENT
Start: 2021-06-16 | End: 2021-06-16

## 2021-06-16 RX ORDER — ACETAMINOPHEN 500 MG
1000 TABLET ORAL
Status: CANCELLED | OUTPATIENT
Start: 2021-06-16 | End: 2021-06-16

## 2021-06-16 RX ORDER — SODIUM CHLORIDE, SODIUM LACTATE, POTASSIUM CHLORIDE, CALCIUM CHLORIDE 600; 310; 30; 20 MG/100ML; MG/100ML; MG/100ML; MG/100ML
INJECTION, SOLUTION INTRAVENOUS CONTINUOUS
Status: CANCELLED | OUTPATIENT
Start: 2021-06-16

## 2021-06-16 RX ORDER — FAMOTIDINE 20 MG/1
20 TABLET, FILM COATED ORAL
Status: CANCELLED | OUTPATIENT
Start: 2021-06-16 | End: 2021-06-16

## 2021-06-21 ENCOUNTER — TELEPHONE (OUTPATIENT)
Dept: GYNECOLOGIC ONCOLOGY | Facility: CLINIC | Age: 36
End: 2021-06-21

## 2021-06-22 ENCOUNTER — ANESTHESIA (OUTPATIENT)
Dept: SURGERY | Facility: OTHER | Age: 36
End: 2021-06-22
Payer: MEDICAID

## 2021-06-22 ENCOUNTER — HOSPITAL ENCOUNTER (OUTPATIENT)
Facility: OTHER | Age: 36
Discharge: HOME | End: 2021-06-22
Attending: OBSTETRICS & GYNECOLOGY | Admitting: OBSTETRICS & GYNECOLOGY
Payer: MEDICAID

## 2021-06-22 VITALS
HEART RATE: 84 BPM | HEIGHT: 66 IN | OXYGEN SATURATION: 98 % | SYSTOLIC BLOOD PRESSURE: 133 MMHG | WEIGHT: 293 LBS | BODY MASS INDEX: 47.09 KG/M2 | RESPIRATION RATE: 16 BRPM | TEMPERATURE: 98 F | DIASTOLIC BLOOD PRESSURE: 72 MMHG

## 2021-06-22 DIAGNOSIS — N92.0 EXCESSIVE OR FREQUENT MENSTRUATION: ICD-10-CM

## 2021-06-22 DIAGNOSIS — Z98.890 STATUS POST HYSTEROSCOPY: Primary | ICD-10-CM

## 2021-06-22 DIAGNOSIS — N93.9 ABNORMAL UTERINE BLEEDING: ICD-10-CM

## 2021-06-22 LAB
B-HCG UR QL: NEGATIVE
CTP QC/QA: YES

## 2021-06-22 PROCEDURE — 25000003 PHARM REV CODE 250: Performed by: OBSTETRICS & GYNECOLOGY

## 2021-06-22 PROCEDURE — 36000706: Performed by: OBSTETRICS & GYNECOLOGY

## 2021-06-22 PROCEDURE — 63600175 PHARM REV CODE 636 W HCPCS: Performed by: NURSE ANESTHETIST, CERTIFIED REGISTERED

## 2021-06-22 PROCEDURE — 88305 TISSUE EXAM BY PATHOLOGIST: CPT | Performed by: PATHOLOGY

## 2021-06-22 PROCEDURE — 25000003 PHARM REV CODE 250: Performed by: SPECIALIST

## 2021-06-22 PROCEDURE — 25000003 PHARM REV CODE 250: Performed by: NURSE ANESTHETIST, CERTIFIED REGISTERED

## 2021-06-22 PROCEDURE — 36000707: Performed by: OBSTETRICS & GYNECOLOGY

## 2021-06-22 PROCEDURE — 88305 TISSUE EXAM BY PATHOLOGIST: CPT | Mod: 26,,, | Performed by: PATHOLOGY

## 2021-06-22 PROCEDURE — 00952 ANES VAG PX HYSTSC&/HSG: CPT | Performed by: OBSTETRICS & GYNECOLOGY

## 2021-06-22 PROCEDURE — 25000003 PHARM REV CODE 250: Performed by: ANESTHESIOLOGY

## 2021-06-22 PROCEDURE — 37000008 HC ANESTHESIA 1ST 15 MINUTES: Performed by: OBSTETRICS & GYNECOLOGY

## 2021-06-22 PROCEDURE — 27201423 OPTIME MED/SURG SUP & DEVICES STERILE SUPPLY: Performed by: OBSTETRICS & GYNECOLOGY

## 2021-06-22 PROCEDURE — 37000009 HC ANESTHESIA EA ADD 15 MINS: Performed by: OBSTETRICS & GYNECOLOGY

## 2021-06-22 PROCEDURE — 71000015 HC POSTOP RECOV 1ST HR: Performed by: OBSTETRICS & GYNECOLOGY

## 2021-06-22 PROCEDURE — 71000033 HC RECOVERY, INTIAL HOUR: Performed by: OBSTETRICS & GYNECOLOGY

## 2021-06-22 PROCEDURE — 58558 HYSTEROSCOPY BIOPSY: CPT | Mod: ,,, | Performed by: OBSTETRICS & GYNECOLOGY

## 2021-06-22 PROCEDURE — 88305 TISSUE EXAM BY PATHOLOGIST: ICD-10-PCS | Mod: 26,,, | Performed by: PATHOLOGY

## 2021-06-22 PROCEDURE — 58558 PR HYSTEROSCOPY,W/ENDO BX: ICD-10-PCS | Mod: ,,, | Performed by: OBSTETRICS & GYNECOLOGY

## 2021-06-22 PROCEDURE — 81025 URINE PREGNANCY TEST: CPT | Performed by: SPECIALIST

## 2021-06-22 PROCEDURE — 63600175 PHARM REV CODE 636 W HCPCS

## 2021-06-22 PROCEDURE — 63600175 PHARM REV CODE 636 W HCPCS: Performed by: SPECIALIST

## 2021-06-22 RX ORDER — DEXAMETHASONE SODIUM PHOSPHATE 4 MG/ML
INJECTION, SOLUTION INTRA-ARTICULAR; INTRALESIONAL; INTRAMUSCULAR; INTRAVENOUS; SOFT TISSUE
Status: DISCONTINUED | OUTPATIENT
Start: 2021-06-22 | End: 2021-06-22

## 2021-06-22 RX ORDER — PROPOFOL 10 MG/ML
VIAL (ML) INTRAVENOUS
Status: DISCONTINUED | OUTPATIENT
Start: 2021-06-22 | End: 2021-06-22

## 2021-06-22 RX ORDER — ACETAMINOPHEN 500 MG
1000 TABLET ORAL
Status: DISCONTINUED | OUTPATIENT
Start: 2021-06-22 | End: 2021-06-22

## 2021-06-22 RX ORDER — ACETAMINOPHEN 500 MG
1000 TABLET ORAL
Status: COMPLETED | OUTPATIENT
Start: 2021-06-22 | End: 2021-06-22

## 2021-06-22 RX ORDER — FAMOTIDINE 20 MG/1
20 TABLET, FILM COATED ORAL
Status: COMPLETED | OUTPATIENT
Start: 2021-06-22 | End: 2021-06-22

## 2021-06-22 RX ORDER — MUPIROCIN 20 MG/G
OINTMENT TOPICAL
Status: DISCONTINUED | OUTPATIENT
Start: 2021-06-22 | End: 2021-06-22 | Stop reason: HOSPADM

## 2021-06-22 RX ORDER — SODIUM CHLORIDE, SODIUM LACTATE, POTASSIUM CHLORIDE, CALCIUM CHLORIDE 600; 310; 30; 20 MG/100ML; MG/100ML; MG/100ML; MG/100ML
INJECTION, SOLUTION INTRAVENOUS CONTINUOUS
Status: DISCONTINUED | OUTPATIENT
Start: 2021-06-22 | End: 2021-06-22 | Stop reason: HOSPADM

## 2021-06-22 RX ORDER — HYDROMORPHONE HYDROCHLORIDE 2 MG/ML
0.4 INJECTION, SOLUTION INTRAMUSCULAR; INTRAVENOUS; SUBCUTANEOUS EVERY 5 MIN PRN
Status: DISCONTINUED | OUTPATIENT
Start: 2021-06-22 | End: 2021-06-22 | Stop reason: HOSPADM

## 2021-06-22 RX ORDER — DEXTROMETHORPHAN HYDROBROMIDE, GUAIFENESIN 5; 100 MG/5ML; MG/5ML
650 LIQUID ORAL EVERY 6 HOURS
Qty: 30 TABLET | Refills: 0 | Status: SHIPPED | OUTPATIENT
Start: 2021-06-22 | End: 2021-12-07 | Stop reason: CLARIF

## 2021-06-22 RX ORDER — SUCCINYLCHOLINE CHLORIDE 20 MG/ML
INJECTION INTRAMUSCULAR; INTRAVENOUS
Status: DISCONTINUED | OUTPATIENT
Start: 2021-06-22 | End: 2021-06-22

## 2021-06-22 RX ORDER — DIPHENHYDRAMINE HYDROCHLORIDE 50 MG/ML
25 INJECTION INTRAMUSCULAR; INTRAVENOUS EVERY 6 HOURS PRN
Status: DISCONTINUED | OUTPATIENT
Start: 2021-06-22 | End: 2021-06-22 | Stop reason: HOSPADM

## 2021-06-22 RX ORDER — ONDANSETRON 2 MG/ML
INJECTION INTRAMUSCULAR; INTRAVENOUS
Status: DISCONTINUED | OUTPATIENT
Start: 2021-06-22 | End: 2021-06-22

## 2021-06-22 RX ORDER — OXYCODONE HYDROCHLORIDE 5 MG/1
5 TABLET ORAL
Status: DISCONTINUED | OUTPATIENT
Start: 2021-06-22 | End: 2021-06-22 | Stop reason: HOSPADM

## 2021-06-22 RX ORDER — MEPERIDINE HYDROCHLORIDE 25 MG/ML
12.5 INJECTION INTRAMUSCULAR; INTRAVENOUS; SUBCUTANEOUS ONCE AS NEEDED
Status: DISCONTINUED | OUTPATIENT
Start: 2021-06-22 | End: 2021-06-22 | Stop reason: HOSPADM

## 2021-06-22 RX ORDER — LABETALOL HYDROCHLORIDE 5 MG/ML
INJECTION, SOLUTION INTRAVENOUS
Status: DISCONTINUED | OUTPATIENT
Start: 2021-06-22 | End: 2021-06-22

## 2021-06-22 RX ORDER — SODIUM CHLORIDE 0.9 % (FLUSH) 0.9 %
3 SYRINGE (ML) INJECTION
Status: DISCONTINUED | OUTPATIENT
Start: 2021-06-22 | End: 2021-06-22 | Stop reason: HOSPADM

## 2021-06-22 RX ORDER — LIDOCAINE HYDROCHLORIDE 10 MG/ML
1 INJECTION, SOLUTION EPIDURAL; INFILTRATION; INTRACAUDAL; PERINEURAL ONCE
Status: DISCONTINUED | OUTPATIENT
Start: 2021-06-22 | End: 2021-06-22

## 2021-06-22 RX ORDER — MIDAZOLAM HYDROCHLORIDE 1 MG/ML
INJECTION INTRAMUSCULAR; INTRAVENOUS
Status: DISCONTINUED | OUTPATIENT
Start: 2021-06-22 | End: 2021-06-22

## 2021-06-22 RX ORDER — ONDANSETRON 2 MG/ML
4 INJECTION INTRAMUSCULAR; INTRAVENOUS DAILY PRN
Status: DISCONTINUED | OUTPATIENT
Start: 2021-06-22 | End: 2021-06-22 | Stop reason: HOSPADM

## 2021-06-22 RX ORDER — FENTANYL CITRATE 50 UG/ML
INJECTION, SOLUTION INTRAMUSCULAR; INTRAVENOUS
Status: DISCONTINUED | OUTPATIENT
Start: 2021-06-22 | End: 2021-06-22

## 2021-06-22 RX ORDER — LIDOCAINE HYDROCHLORIDE 10 MG/ML
0.5 INJECTION, SOLUTION EPIDURAL; INFILTRATION; INTRACAUDAL; PERINEURAL ONCE
Status: DISCONTINUED | OUTPATIENT
Start: 2021-06-22 | End: 2021-06-22 | Stop reason: HOSPADM

## 2021-06-22 RX ORDER — IBUPROFEN 600 MG/1
600 TABLET ORAL 3 TIMES DAILY
Qty: 30 TABLET | Refills: 0 | Status: SHIPPED | OUTPATIENT
Start: 2021-06-22 | End: 2021-12-07 | Stop reason: CLARIF

## 2021-06-22 RX ADMIN — ACETAMINOPHEN 1000 MG: 500 TABLET, FILM COATED ORAL at 08:06

## 2021-06-22 RX ADMIN — DEXAMETHASONE SODIUM PHOSPHATE 12 MG: 4 INJECTION, SOLUTION INTRAMUSCULAR; INTRAVENOUS at 09:06

## 2021-06-22 RX ADMIN — MUPIROCIN: 20 OINTMENT TOPICAL at 08:06

## 2021-06-22 RX ADMIN — FENTANYL CITRATE 100 MCG: 50 INJECTION, SOLUTION INTRAMUSCULAR; INTRAVENOUS at 09:06

## 2021-06-22 RX ADMIN — FAMOTIDINE 20 MG: 20 TABLET ORAL at 08:06

## 2021-06-22 RX ADMIN — CARBOXYMETHYLCELLULOSE SODIUM 2 DROP: 2.5 SOLUTION/ DROPS OPHTHALMIC at 09:06

## 2021-06-22 RX ADMIN — MIDAZOLAM HYDROCHLORIDE 2 MG: 1 INJECTION, SOLUTION INTRAMUSCULAR; INTRAVENOUS at 09:06

## 2021-06-22 RX ADMIN — ONDANSETRON HYDROCHLORIDE 4 MG: 2 INJECTION INTRAMUSCULAR; INTRAVENOUS at 09:06

## 2021-06-22 RX ADMIN — LABETALOL HYDROCHLORIDE 10 MG: 5 INJECTION, SOLUTION INTRAVENOUS at 10:06

## 2021-06-22 RX ADMIN — PROPOFOL 300 MG: 10 INJECTION, EMULSION INTRAVENOUS at 09:06

## 2021-06-22 RX ADMIN — SODIUM CHLORIDE, SODIUM LACTATE, POTASSIUM CHLORIDE, AND CALCIUM CHLORIDE: 600; 310; 30; 20 INJECTION, SOLUTION INTRAVENOUS at 08:06

## 2021-06-22 RX ADMIN — SUCCINYLCHOLINE CHLORIDE 200 MG: 20 INJECTION, SOLUTION INTRAMUSCULAR; INTRAVENOUS at 09:06

## 2021-06-28 LAB
FINAL PATHOLOGIC DIAGNOSIS: NORMAL
GROSS: NORMAL
Lab: NORMAL

## 2021-10-14 ENCOUNTER — PATIENT MESSAGE (OUTPATIENT)
Dept: GYNECOLOGIC ONCOLOGY | Facility: CLINIC | Age: 36
End: 2021-10-14
Payer: MEDICAID

## 2021-10-29 ENCOUNTER — TELEPHONE (OUTPATIENT)
Dept: GYNECOLOGIC ONCOLOGY | Facility: CLINIC | Age: 36
End: 2021-10-29
Payer: MEDICAID

## 2021-10-29 DIAGNOSIS — N92.0 EXCESSIVE OR FREQUENT MENSTRUATION: Primary | ICD-10-CM

## 2021-10-29 DIAGNOSIS — Z01.818 PRE-OP TESTING: ICD-10-CM

## 2021-12-07 ENCOUNTER — ANESTHESIA EVENT (OUTPATIENT)
Dept: SURGERY | Facility: OTHER | Age: 36
End: 2021-12-07
Payer: MEDICAID

## 2021-12-07 ENCOUNTER — HOSPITAL ENCOUNTER (OUTPATIENT)
Dept: PREADMISSION TESTING | Facility: OTHER | Age: 36
Discharge: HOME | End: 2021-12-07
Attending: OBSTETRICS & GYNECOLOGY
Payer: MEDICAID

## 2021-12-07 VITALS
RESPIRATION RATE: 18 BRPM | BODY MASS INDEX: 47.09 KG/M2 | OXYGEN SATURATION: 99 % | SYSTOLIC BLOOD PRESSURE: 176 MMHG | HEIGHT: 66 IN | TEMPERATURE: 97 F | WEIGHT: 293 LBS | DIASTOLIC BLOOD PRESSURE: 86 MMHG | HEART RATE: 74 BPM

## 2021-12-07 DIAGNOSIS — Z01.818 PREOP TESTING: Primary | ICD-10-CM

## 2021-12-07 LAB
ABO + RH BLD: NORMAL
ANION GAP SERPL CALC-SCNC: 8 MMOL/L (ref 8–16)
BASOPHILS # BLD AUTO: 0.02 K/UL (ref 0–0.2)
BASOPHILS NFR BLD: 0.6 % (ref 0–1.9)
BLD GP AB SCN CELLS X3 SERPL QL: NORMAL
BUN SERPL-MCNC: 11 MG/DL (ref 6–20)
CALCIUM SERPL-MCNC: 9 MG/DL (ref 8.7–10.5)
CHLORIDE SERPL-SCNC: 108 MMOL/L (ref 95–110)
CO2 SERPL-SCNC: 26 MMOL/L (ref 23–29)
CREAT SERPL-MCNC: 0.7 MG/DL (ref 0.5–1.4)
DIFFERENTIAL METHOD: ABNORMAL
EOSINOPHIL # BLD AUTO: 0.2 K/UL (ref 0–0.5)
EOSINOPHIL NFR BLD: 5.6 % (ref 0–8)
ERYTHROCYTE [DISTWIDTH] IN BLOOD BY AUTOMATED COUNT: 13.4 % (ref 11.5–14.5)
EST. GFR  (AFRICAN AMERICAN): >60 ML/MIN/1.73 M^2
EST. GFR  (NON AFRICAN AMERICAN): >60 ML/MIN/1.73 M^2
GLUCOSE SERPL-MCNC: 86 MG/DL (ref 70–110)
HCT VFR BLD AUTO: 58.6 % (ref 37–48.5)
HGB BLD-MCNC: 18.8 G/DL (ref 12–16)
IMM GRANULOCYTES # BLD AUTO: 0.01 K/UL (ref 0–0.04)
IMM GRANULOCYTES NFR BLD AUTO: 0.3 % (ref 0–0.5)
LYMPHOCYTES # BLD AUTO: 1.7 K/UL (ref 1–4.8)
LYMPHOCYTES NFR BLD: 48.6 % (ref 18–48)
MCH RBC QN AUTO: 29.3 PG (ref 27–31)
MCHC RBC AUTO-ENTMCNC: 32.1 G/DL (ref 32–36)
MCV RBC AUTO: 91 FL (ref 82–98)
MONOCYTES # BLD AUTO: 0.2 K/UL (ref 0.3–1)
MONOCYTES NFR BLD: 6.2 % (ref 4–15)
NEUTROPHILS # BLD AUTO: 1.4 K/UL (ref 1.8–7.7)
NEUTROPHILS NFR BLD: 38.7 % (ref 38–73)
NRBC BLD-RTO: 0 /100 WBC
PLATELET # BLD AUTO: 164 K/UL (ref 150–450)
PMV BLD AUTO: 10.7 FL (ref 9.2–12.9)
POTASSIUM SERPL-SCNC: 4.7 MMOL/L (ref 3.5–5.1)
RBC # BLD AUTO: 6.41 M/UL (ref 4–5.4)
SODIUM SERPL-SCNC: 142 MMOL/L (ref 136–145)
WBC # BLD AUTO: 3.56 K/UL (ref 3.9–12.7)

## 2021-12-07 PROCEDURE — 80048 BASIC METABOLIC PNL TOTAL CA: CPT | Performed by: ANESTHESIOLOGY

## 2021-12-07 PROCEDURE — 86900 BLOOD TYPING SEROLOGIC ABO: CPT | Performed by: ANESTHESIOLOGY

## 2021-12-07 PROCEDURE — 85025 COMPLETE CBC W/AUTO DIFF WBC: CPT | Performed by: ANESTHESIOLOGY

## 2021-12-07 PROCEDURE — 36415 COLL VENOUS BLD VENIPUNCTURE: CPT | Performed by: ANESTHESIOLOGY

## 2021-12-07 PROCEDURE — 93005 ELECTROCARDIOGRAM TRACING: CPT

## 2021-12-07 RX ORDER — SODIUM CHLORIDE, SODIUM LACTATE, POTASSIUM CHLORIDE, CALCIUM CHLORIDE 600; 310; 30; 20 MG/100ML; MG/100ML; MG/100ML; MG/100ML
INJECTION, SOLUTION INTRAVENOUS CONTINUOUS
Status: CANCELLED | OUTPATIENT
Start: 2021-12-07

## 2021-12-07 RX ORDER — LIDOCAINE HYDROCHLORIDE 10 MG/ML
0.5 INJECTION, SOLUTION EPIDURAL; INFILTRATION; INTRACAUDAL; PERINEURAL ONCE
Status: CANCELLED | OUTPATIENT
Start: 2021-12-07 | End: 2021-12-07

## 2021-12-13 ENCOUNTER — PATIENT MESSAGE (OUTPATIENT)
Dept: SURGERY | Facility: OTHER | Age: 36
End: 2021-12-13
Payer: MEDICAID

## 2021-12-13 ENCOUNTER — TELEPHONE (OUTPATIENT)
Dept: GYNECOLOGIC ONCOLOGY | Facility: CLINIC | Age: 36
End: 2021-12-13
Payer: MEDICAID

## 2021-12-14 ENCOUNTER — ANESTHESIA (OUTPATIENT)
Dept: SURGERY | Facility: OTHER | Age: 36
End: 2021-12-14
Payer: MEDICAID

## 2021-12-14 ENCOUNTER — HOSPITAL ENCOUNTER (OUTPATIENT)
Facility: OTHER | Age: 36
Discharge: HOME | End: 2021-12-14
Attending: OBSTETRICS & GYNECOLOGY | Admitting: OBSTETRICS & GYNECOLOGY
Payer: MEDICAID

## 2021-12-14 DIAGNOSIS — Z90.710 S/P LAPAROSCOPIC HYSTERECTOMY: Primary | ICD-10-CM

## 2021-12-14 DIAGNOSIS — N93.9 ABNORMAL UTERINE BLEEDING: ICD-10-CM

## 2021-12-14 LAB
B-HCG UR QL: NEGATIVE
CTP QC/QA: YES
POCT GLUCOSE: 95 MG/DL (ref 70–110)

## 2021-12-14 PROCEDURE — 71000033 HC RECOVERY, INTIAL HOUR: Performed by: OBSTETRICS & GYNECOLOGY

## 2021-12-14 PROCEDURE — 00840 ANES IPER PX LOWER ABD NOS: CPT | Performed by: OBSTETRICS & GYNECOLOGY

## 2021-12-14 PROCEDURE — 58571 PR LAPAROSCOPY W TOT HYSTERECTUTERUS <=250 GRAM  W TUBE/OVARY: ICD-10-PCS | Mod: 22,,, | Performed by: OBSTETRICS & GYNECOLOGY

## 2021-12-14 PROCEDURE — 88302 TISSUE EXAM BY PATHOLOGIST: CPT | Performed by: PATHOLOGY

## 2021-12-14 PROCEDURE — C2628 CATHETER, OCCLUSION: HCPCS | Performed by: OBSTETRICS & GYNECOLOGY

## 2021-12-14 PROCEDURE — 63600175 PHARM REV CODE 636 W HCPCS: Performed by: OBSTETRICS & GYNECOLOGY

## 2021-12-14 PROCEDURE — 71000016 HC POSTOP RECOV ADDL HR: Performed by: OBSTETRICS & GYNECOLOGY

## 2021-12-14 PROCEDURE — P9045 ALBUMIN (HUMAN), 5%, 250 ML: HCPCS | Mod: JG | Performed by: NURSE ANESTHETIST, CERTIFIED REGISTERED

## 2021-12-14 PROCEDURE — 25000003 PHARM REV CODE 250: Performed by: OBSTETRICS & GYNECOLOGY

## 2021-12-14 PROCEDURE — 36000713 HC OR TIME LEV V EA ADD 15 MIN: Performed by: OBSTETRICS & GYNECOLOGY

## 2021-12-14 PROCEDURE — 37000009 HC ANESTHESIA EA ADD 15 MINS: Performed by: OBSTETRICS & GYNECOLOGY

## 2021-12-14 PROCEDURE — 25000003 PHARM REV CODE 250: Performed by: ANESTHESIOLOGY

## 2021-12-14 PROCEDURE — 25000003 PHARM REV CODE 250: Performed by: STUDENT IN AN ORGANIZED HEALTH CARE EDUCATION/TRAINING PROGRAM

## 2021-12-14 PROCEDURE — 88302 PR  SURG PATH,LEVEL II: ICD-10-PCS | Mod: 26,,, | Performed by: PATHOLOGY

## 2021-12-14 PROCEDURE — 63600175 PHARM REV CODE 636 W HCPCS: Mod: JG | Performed by: NURSE ANESTHETIST, CERTIFIED REGISTERED

## 2021-12-14 PROCEDURE — 88307 TISSUE EXAM BY PATHOLOGIST: CPT | Mod: 26,,, | Performed by: PATHOLOGY

## 2021-12-14 PROCEDURE — 81025 URINE PREGNANCY TEST: CPT | Performed by: ANESTHESIOLOGY

## 2021-12-14 PROCEDURE — 88307 PR  SURG PATH,LEVEL V: ICD-10-PCS | Mod: 26,,, | Performed by: PATHOLOGY

## 2021-12-14 PROCEDURE — 58571 TLH W/T/O 250 G OR LESS: CPT | Mod: 22,AS,, | Performed by: NURSE PRACTITIONER

## 2021-12-14 PROCEDURE — 88307 TISSUE EXAM BY PATHOLOGIST: CPT | Performed by: PATHOLOGY

## 2021-12-14 PROCEDURE — 36000712 HC OR TIME LEV V 1ST 15 MIN: Performed by: OBSTETRICS & GYNECOLOGY

## 2021-12-14 PROCEDURE — 88302 TISSUE EXAM BY PATHOLOGIST: CPT | Mod: 26,,, | Performed by: PATHOLOGY

## 2021-12-14 PROCEDURE — 25000003 PHARM REV CODE 250: Performed by: NURSE ANESTHETIST, CERTIFIED REGISTERED

## 2021-12-14 PROCEDURE — 63600175 PHARM REV CODE 636 W HCPCS: Performed by: ANESTHESIOLOGY

## 2021-12-14 PROCEDURE — 82962 GLUCOSE BLOOD TEST: CPT | Performed by: OBSTETRICS & GYNECOLOGY

## 2021-12-14 PROCEDURE — 37000008 HC ANESTHESIA 1ST 15 MINUTES: Performed by: OBSTETRICS & GYNECOLOGY

## 2021-12-14 PROCEDURE — 27201423 OPTIME MED/SURG SUP & DEVICES STERILE SUPPLY: Performed by: OBSTETRICS & GYNECOLOGY

## 2021-12-14 PROCEDURE — 58571 PR LAPAROSCOPY W TOT HYSTERECTUTERUS <=250 GRAM  W TUBE/OVARY: ICD-10-PCS | Mod: 22,AS,, | Performed by: NURSE PRACTITIONER

## 2021-12-14 PROCEDURE — 58571 TLH W/T/O 250 G OR LESS: CPT | Mod: 22,,, | Performed by: OBSTETRICS & GYNECOLOGY

## 2021-12-14 PROCEDURE — 71000015 HC POSTOP RECOV 1ST HR: Performed by: OBSTETRICS & GYNECOLOGY

## 2021-12-14 RX ORDER — PHENYLEPHRINE HYDROCHLORIDE 10 MG/ML
INJECTION INTRAVENOUS
Status: DISCONTINUED | OUTPATIENT
Start: 2021-12-14 | End: 2021-12-14

## 2021-12-14 RX ORDER — PROCHLORPERAZINE EDISYLATE 5 MG/ML
5 INJECTION INTRAMUSCULAR; INTRAVENOUS EVERY 30 MIN PRN
Status: DISCONTINUED | OUTPATIENT
Start: 2021-12-14 | End: 2021-12-14 | Stop reason: HOSPADM

## 2021-12-14 RX ORDER — SUCCINYLCHOLINE CHLORIDE 20 MG/ML
INJECTION INTRAMUSCULAR; INTRAVENOUS
Status: DISCONTINUED | OUTPATIENT
Start: 2021-12-14 | End: 2021-12-14

## 2021-12-14 RX ORDER — HYDROMORPHONE HYDROCHLORIDE 2 MG/ML
0.5 INJECTION, SOLUTION INTRAMUSCULAR; INTRAVENOUS; SUBCUTANEOUS
Status: CANCELLED | OUTPATIENT
Start: 2021-12-14

## 2021-12-14 RX ORDER — ALBUMIN HUMAN 50 G/1000ML
SOLUTION INTRAVENOUS CONTINUOUS PRN
Status: DISCONTINUED | OUTPATIENT
Start: 2021-12-14 | End: 2021-12-14

## 2021-12-14 RX ORDER — PROCHLORPERAZINE EDISYLATE 5 MG/ML
5 INJECTION INTRAMUSCULAR; INTRAVENOUS EVERY 6 HOURS PRN
Status: CANCELLED | OUTPATIENT
Start: 2021-12-14

## 2021-12-14 RX ORDER — METFORMIN HYDROCHLORIDE 500 MG/1
2000 TABLET ORAL 2 TIMES DAILY WITH MEALS
COMMUNITY
End: 2023-12-01

## 2021-12-14 RX ORDER — LIDOCAINE HYDROCHLORIDE 10 MG/ML
1 INJECTION, SOLUTION EPIDURAL; INFILTRATION; INTRACAUDAL; PERINEURAL ONCE
Status: DISCONTINUED | OUTPATIENT
Start: 2021-12-14 | End: 2021-12-14

## 2021-12-14 RX ORDER — ONDANSETRON 8 MG/1
8 TABLET, ORALLY DISINTEGRATING ORAL EVERY 8 HOURS PRN
Status: DISCONTINUED | OUTPATIENT
Start: 2021-12-14 | End: 2021-12-14 | Stop reason: HOSPADM

## 2021-12-14 RX ORDER — IBUPROFEN 600 MG/1
600 TABLET ORAL EVERY 6 HOURS
Qty: 30 TABLET | Refills: 1 | Status: SHIPPED | OUTPATIENT
Start: 2021-12-14

## 2021-12-14 RX ORDER — DEXAMETHASONE SODIUM PHOSPHATE 4 MG/ML
INJECTION, SOLUTION INTRA-ARTICULAR; INTRALESIONAL; INTRAMUSCULAR; INTRAVENOUS; SOFT TISSUE
Status: DISCONTINUED | OUTPATIENT
Start: 2021-12-14 | End: 2021-12-14

## 2021-12-14 RX ORDER — ATENOLOL 50 MG/1
50 TABLET ORAL DAILY
COMMUNITY

## 2021-12-14 RX ORDER — MEPERIDINE HYDROCHLORIDE 25 MG/ML
12.5 INJECTION INTRAMUSCULAR; INTRAVENOUS; SUBCUTANEOUS ONCE AS NEEDED
Status: DISCONTINUED | OUTPATIENT
Start: 2021-12-14 | End: 2021-12-14 | Stop reason: HOSPADM

## 2021-12-14 RX ORDER — LIDOCAINE HYDROCHLORIDE 10 MG/ML
INJECTION INFILTRATION; PERINEURAL
Status: DISCONTINUED | OUTPATIENT
Start: 2021-12-14 | End: 2021-12-14 | Stop reason: HOSPADM

## 2021-12-14 RX ORDER — CEFAZOLIN SODIUM 1 G/3ML
3 INJECTION, POWDER, FOR SOLUTION INTRAMUSCULAR; INTRAVENOUS
Status: COMPLETED | OUTPATIENT
Start: 2021-12-14 | End: 2021-12-14

## 2021-12-14 RX ORDER — PROPOFOL 10 MG/ML
VIAL (ML) INTRAVENOUS
Status: DISCONTINUED | OUTPATIENT
Start: 2021-12-14 | End: 2021-12-14

## 2021-12-14 RX ORDER — DIPHENHYDRAMINE HYDROCHLORIDE 50 MG/ML
25 INJECTION INTRAMUSCULAR; INTRAVENOUS EVERY 6 HOURS PRN
Status: DISCONTINUED | OUTPATIENT
Start: 2021-12-14 | End: 2021-12-14 | Stop reason: HOSPADM

## 2021-12-14 RX ORDER — HEPARIN SODIUM 5000 [USP'U]/ML
INJECTION, SOLUTION INTRAVENOUS; SUBCUTANEOUS
Status: DISCONTINUED | OUTPATIENT
Start: 2021-12-14 | End: 2021-12-14 | Stop reason: HOSPADM

## 2021-12-14 RX ORDER — OXYCODONE HYDROCHLORIDE 5 MG/1
5 TABLET ORAL
Status: DISCONTINUED | OUTPATIENT
Start: 2021-12-14 | End: 2021-12-14 | Stop reason: HOSPADM

## 2021-12-14 RX ORDER — KETOROLAC TROMETHAMINE 30 MG/ML
INJECTION, SOLUTION INTRAMUSCULAR; INTRAVENOUS
Status: DISCONTINUED | OUTPATIENT
Start: 2021-12-14 | End: 2021-12-14

## 2021-12-14 RX ORDER — OXYCODONE HYDROCHLORIDE 5 MG/1
5 TABLET ORAL EVERY 4 HOURS PRN
Qty: 30 TABLET | Refills: 0 | Status: SHIPPED | OUTPATIENT
Start: 2021-12-14 | End: 2023-12-01

## 2021-12-14 RX ORDER — ACETAMINOPHEN 500 MG
1000 TABLET ORAL EVERY 6 HOURS
Qty: 30 TABLET | Refills: 1 | Status: SHIPPED | OUTPATIENT
Start: 2021-12-14 | End: 2023-12-01

## 2021-12-14 RX ORDER — IBUPROFEN 600 MG/1
600 TABLET ORAL EVERY 6 HOURS PRN
Status: DISCONTINUED | OUTPATIENT
Start: 2021-12-14 | End: 2021-12-14 | Stop reason: HOSPADM

## 2021-12-14 RX ORDER — LIDOCAINE HCL/PF 100 MG/5ML
SYRINGE (ML) INTRAVENOUS
Status: DISCONTINUED | OUTPATIENT
Start: 2021-12-14 | End: 2021-12-14

## 2021-12-14 RX ORDER — HYDROCODONE BITARTRATE AND ACETAMINOPHEN 5; 325 MG/1; MG/1
1 TABLET ORAL EVERY 4 HOURS PRN
Status: CANCELLED | OUTPATIENT
Start: 2021-12-14

## 2021-12-14 RX ORDER — SENNOSIDES 8.6 MG/1
1 TABLET ORAL 2 TIMES DAILY
Qty: 30 TABLET | Refills: 1 | Status: SHIPPED | OUTPATIENT
Start: 2021-12-14 | End: 2023-12-01

## 2021-12-14 RX ORDER — MUPIROCIN 20 MG/G
OINTMENT TOPICAL
Status: DISCONTINUED | OUTPATIENT
Start: 2021-12-14 | End: 2021-12-14 | Stop reason: HOSPADM

## 2021-12-14 RX ORDER — LIDOCAINE HYDROCHLORIDE 10 MG/ML
0.5 INJECTION, SOLUTION EPIDURAL; INFILTRATION; INTRACAUDAL; PERINEURAL ONCE
Status: DISCONTINUED | OUTPATIENT
Start: 2021-12-14 | End: 2021-12-14 | Stop reason: HOSPADM

## 2021-12-14 RX ORDER — HYDROCODONE BITARTRATE AND ACETAMINOPHEN 10; 325 MG/1; MG/1
1 TABLET ORAL EVERY 4 HOURS PRN
Status: CANCELLED | OUTPATIENT
Start: 2021-12-14

## 2021-12-14 RX ORDER — SODIUM CHLORIDE 0.9 % (FLUSH) 0.9 %
3 SYRINGE (ML) INJECTION
Status: DISCONTINUED | OUTPATIENT
Start: 2021-12-14 | End: 2021-12-14 | Stop reason: HOSPADM

## 2021-12-14 RX ORDER — ROCURONIUM BROMIDE 10 MG/ML
INJECTION, SOLUTION INTRAVENOUS
Status: DISCONTINUED | OUTPATIENT
Start: 2021-12-14 | End: 2021-12-14

## 2021-12-14 RX ORDER — FENTANYL CITRATE 50 UG/ML
INJECTION, SOLUTION INTRAMUSCULAR; INTRAVENOUS
Status: DISCONTINUED | OUTPATIENT
Start: 2021-12-14 | End: 2021-12-14

## 2021-12-14 RX ORDER — SODIUM CHLORIDE, SODIUM LACTATE, POTASSIUM CHLORIDE, CALCIUM CHLORIDE 600; 310; 30; 20 MG/100ML; MG/100ML; MG/100ML; MG/100ML
INJECTION, SOLUTION INTRAVENOUS CONTINUOUS
Status: DISCONTINUED | OUTPATIENT
Start: 2021-12-14 | End: 2021-12-14 | Stop reason: HOSPADM

## 2021-12-14 RX ORDER — HYDROMORPHONE HYDROCHLORIDE 2 MG/ML
0.4 INJECTION, SOLUTION INTRAMUSCULAR; INTRAVENOUS; SUBCUTANEOUS EVERY 5 MIN PRN
Status: DISCONTINUED | OUTPATIENT
Start: 2021-12-14 | End: 2021-12-14 | Stop reason: HOSPADM

## 2021-12-14 RX ORDER — ONDANSETRON HYDROCHLORIDE 2 MG/ML
INJECTION, SOLUTION INTRAMUSCULAR; INTRAVENOUS
Status: DISCONTINUED | OUTPATIENT
Start: 2021-12-14 | End: 2021-12-14

## 2021-12-14 RX ADMIN — CEFAZOLIN 3 G: 330 INJECTION, POWDER, FOR SOLUTION INTRAMUSCULAR; INTRAVENOUS at 10:12

## 2021-12-14 RX ADMIN — ROCURONIUM BROMIDE 30 MG: 10 INJECTION, SOLUTION INTRAVENOUS at 11:12

## 2021-12-14 RX ADMIN — ROCURONIUM BROMIDE 40 MG: 10 INJECTION, SOLUTION INTRAVENOUS at 10:12

## 2021-12-14 RX ADMIN — ROCURONIUM BROMIDE 15 MG: 10 INJECTION, SOLUTION INTRAVENOUS at 12:12

## 2021-12-14 RX ADMIN — FENTANYL CITRATE 100 MCG: 50 INJECTION, SOLUTION INTRAMUSCULAR; INTRAVENOUS at 10:12

## 2021-12-14 RX ADMIN — ROCURONIUM BROMIDE 10 MG: 10 INJECTION, SOLUTION INTRAVENOUS at 10:12

## 2021-12-14 RX ADMIN — DEXAMETHASONE SODIUM PHOSPHATE 8 MG: 4 INJECTION, SOLUTION INTRAMUSCULAR; INTRAVENOUS at 10:12

## 2021-12-14 RX ADMIN — PHENYLEPHRINE HYDROCHLORIDE 100 MCG: 10 INJECTION INTRAVENOUS at 11:12

## 2021-12-14 RX ADMIN — PROCHLORPERAZINE EDISYLATE 5 MG: 5 INJECTION INTRAMUSCULAR; INTRAVENOUS at 03:12

## 2021-12-14 RX ADMIN — ONDANSETRON 4 MG: 2 INJECTION, SOLUTION INTRAMUSCULAR; INTRAVENOUS at 01:12

## 2021-12-14 RX ADMIN — ROCURONIUM BROMIDE 15 MG: 10 INJECTION, SOLUTION INTRAVENOUS at 11:12

## 2021-12-14 RX ADMIN — LIDOCAINE HYDROCHLORIDE 100 MG: 20 INJECTION, SOLUTION INTRAVENOUS at 10:12

## 2021-12-14 RX ADMIN — FENTANYL CITRATE 50 MCG: 50 INJECTION, SOLUTION INTRAMUSCULAR; INTRAVENOUS at 12:12

## 2021-12-14 RX ADMIN — ROCURONIUM BROMIDE 20 MG: 10 INJECTION, SOLUTION INTRAVENOUS at 11:12

## 2021-12-14 RX ADMIN — PROPOFOL 200 MG: 10 INJECTION, EMULSION INTRAVENOUS at 10:12

## 2021-12-14 RX ADMIN — SUCCINYLCHOLINE CHLORIDE 140 MG: 20 INJECTION, SOLUTION INTRAMUSCULAR; INTRAVENOUS at 10:12

## 2021-12-14 RX ADMIN — ALBUMIN (HUMAN): 12.5 SOLUTION INTRAVENOUS at 11:12

## 2021-12-14 RX ADMIN — IBUPROFEN 600 MG: 600 TABLET, FILM COATED ORAL at 05:12

## 2021-12-14 RX ADMIN — SUGAMMADEX 400 MG: 100 INJECTION, SOLUTION INTRAVENOUS at 01:12

## 2021-12-14 RX ADMIN — KETOROLAC TROMETHAMINE 30 MG: 30 INJECTION, SOLUTION INTRAMUSCULAR; INTRAVENOUS at 01:12

## 2021-12-14 RX ADMIN — OXYCODONE 5 MG: 5 TABLET ORAL at 01:12

## 2021-12-17 VITALS
HEART RATE: 92 BPM | DIASTOLIC BLOOD PRESSURE: 92 MMHG | RESPIRATION RATE: 16 BRPM | SYSTOLIC BLOOD PRESSURE: 144 MMHG | TEMPERATURE: 98 F | WEIGHT: 293 LBS | OXYGEN SATURATION: 96 % | HEIGHT: 66 IN | BODY MASS INDEX: 47.09 KG/M2

## 2021-12-20 LAB
FINAL PATHOLOGIC DIAGNOSIS: NORMAL
Lab: NORMAL

## 2021-12-21 ENCOUNTER — TELEPHONE (OUTPATIENT)
Dept: GYNECOLOGIC ONCOLOGY | Facility: HOSPITAL | Age: 36
End: 2021-12-21
Payer: MEDICAID

## 2021-12-21 ENCOUNTER — TELEPHONE (OUTPATIENT)
Dept: GYNECOLOGIC ONCOLOGY | Facility: CLINIC | Age: 36
End: 2021-12-21
Payer: MEDICAID

## 2021-12-22 ENCOUNTER — TELEPHONE (OUTPATIENT)
Dept: GYNECOLOGIC ONCOLOGY | Facility: CLINIC | Age: 36
End: 2021-12-22
Payer: MEDICAID

## 2022-01-13 ENCOUNTER — OFFICE VISIT (OUTPATIENT)
Dept: GYNECOLOGIC ONCOLOGY | Facility: CLINIC | Age: 37
End: 2022-01-13
Payer: MEDICAID

## 2022-01-13 VITALS
BODY MASS INDEX: 60.16 KG/M2 | HEART RATE: 77 BPM | SYSTOLIC BLOOD PRESSURE: 156 MMHG | WEIGHT: 293 LBS | DIASTOLIC BLOOD PRESSURE: 93 MMHG

## 2022-01-13 DIAGNOSIS — N93.9 ABNORMAL UTERINE BLEEDING: ICD-10-CM

## 2022-01-13 DIAGNOSIS — Z90.710 S/P LAPAROSCOPIC HYSTERECTOMY: Primary | ICD-10-CM

## 2022-01-13 DIAGNOSIS — E66.01 MORBID OBESITY WITH BMI OF 50.0-59.9, ADULT: ICD-10-CM

## 2022-01-13 PROCEDURE — 99999 PR PBB SHADOW E&M-EST. PATIENT-LVL III: CPT | Mod: PBBFAC,,, | Performed by: OBSTETRICS & GYNECOLOGY

## 2022-01-13 PROCEDURE — 3077F SYST BP >= 140 MM HG: CPT | Mod: CPTII,,, | Performed by: OBSTETRICS & GYNECOLOGY

## 2022-01-13 PROCEDURE — 99024 POSTOP FOLLOW-UP VISIT: CPT | Mod: ,,, | Performed by: OBSTETRICS & GYNECOLOGY

## 2022-01-13 PROCEDURE — 3008F BODY MASS INDEX DOCD: CPT | Mod: CPTII,,, | Performed by: OBSTETRICS & GYNECOLOGY

## 2022-01-13 PROCEDURE — 3080F PR MOST RECENT DIASTOLIC BLOOD PRESSURE >= 90 MM HG: ICD-10-PCS | Mod: CPTII,,, | Performed by: OBSTETRICS & GYNECOLOGY

## 2022-01-13 PROCEDURE — 3008F PR BODY MASS INDEX (BMI) DOCUMENTED: ICD-10-PCS | Mod: CPTII,,, | Performed by: OBSTETRICS & GYNECOLOGY

## 2022-01-13 PROCEDURE — 3080F DIAST BP >= 90 MM HG: CPT | Mod: CPTII,,, | Performed by: OBSTETRICS & GYNECOLOGY

## 2022-01-13 PROCEDURE — 1159F MED LIST DOCD IN RCRD: CPT | Mod: CPTII,,, | Performed by: OBSTETRICS & GYNECOLOGY

## 2022-01-13 PROCEDURE — 99213 OFFICE O/P EST LOW 20 MIN: CPT | Mod: PBBFAC | Performed by: OBSTETRICS & GYNECOLOGY

## 2022-01-13 PROCEDURE — 99999 PR PBB SHADOW E&M-EST. PATIENT-LVL III: ICD-10-PCS | Mod: PBBFAC,,, | Performed by: OBSTETRICS & GYNECOLOGY

## 2022-01-13 PROCEDURE — 99024 PR POST-OP FOLLOW-UP VISIT: ICD-10-PCS | Mod: ,,, | Performed by: OBSTETRICS & GYNECOLOGY

## 2022-01-13 PROCEDURE — 3077F PR MOST RECENT SYSTOLIC BLOOD PRESSURE >= 140 MM HG: ICD-10-PCS | Mod: CPTII,,, | Performed by: OBSTETRICS & GYNECOLOGY

## 2022-01-13 PROCEDURE — 1159F PR MEDICATION LIST DOCUMENTED IN MEDICAL RECORD: ICD-10-PCS | Mod: CPTII,,, | Performed by: OBSTETRICS & GYNECOLOGY

## 2022-01-13 RX ORDER — HYDROCHLOROTHIAZIDE 12.5 MG/1
1 TABLET ORAL DAILY
COMMUNITY
Start: 2022-01-10

## 2022-01-13 RX ORDER — VENLAFAXINE HYDROCHLORIDE 37.5 MG/1
37.5 CAPSULE, EXTENDED RELEASE ORAL DAILY
COMMUNITY
Start: 2022-01-11 | End: 2023-12-01

## 2022-01-13 RX ORDER — TOPIRAMATE 100 MG/1
TABLET, FILM COATED ORAL
COMMUNITY
Start: 2022-01-11 | End: 2023-12-01

## 2022-01-13 RX ORDER — LIRAGLUTIDE 6 MG/ML
1.8 INJECTION SUBCUTANEOUS
COMMUNITY
Start: 2021-12-07 | End: 2023-12-01

## 2022-01-13 RX ORDER — PEN NEEDLE, DIABETIC 29 G X1/2"
1 NEEDLE, DISPOSABLE MISCELLANEOUS
COMMUNITY
Start: 2021-12-07 | End: 2023-12-01

## 2022-01-13 NOTE — PROGRESS NOTES
REFERRING PROVIDER  Joseph Matias MD     INTERVAL HISTORY  CC: Postop Follow-up  Jeb Stuart is a 36 y.o. s/p below procedures 2021.  She has no vaginal bleeding or discharge.  She is having no nausea or vomiting.  She has no bladder complaints.  She has had continued constipation with BM every 4 to 5 days.  She has crampy abdominal pain.  She is slowly resuming her normal activities.    2021 Robotic Assisted total Hysterectomy, Bilateral Salpingectomy  FINDINGS:  Normal external female genitalia.  No vaginal lesions.  Cervix normal appearing.  Uterus sounded to 6 cm.  The uterus had multiple fibroids.  The tubes had evidence of prior tubal ligation and ovaries were grossly normal.  The upper abdomen was free of adhesions and normal to include peritoneal and diaphragmatic surfaces, liver edge, and greater curvature of the stomach.    PATHOLOGY:    A.   UTERUS AND CERVIX, XI ROBOTIC HYSTERECTOMY:          -Cervix with focal mucosal erosion, chronic cervicitis, and benign   Nabothian cysts.          -Secretory phase endometrium, negative for hyperplasia.          -Leiomyomata.          -Serosa, no pathologic alteration.          -Negative for malignancy.   B.   FALLOPIAN TUBE, LEFT, XI ROBOTIC SALPINGECTOMY:          -Exhibiting the usual normal luminal/muscular wall histology.   No   significant pathological changes.   C.   FALLOPIAN TUBE, RIGHT, XI ROBOTIC SALPINGECTOMY:          -Exhibiting the usual normal luminal/muscular wall histology.  No   significant pathological changes.     HPI    with abnormal uterine bleeding.  She has had a prior endometrial ablation and had been on Depo Provera for about one year.  Her bleeding had become more unpredictable during this last cycle of Depo and was heavy, soaking pads at times.  She also has cramping for which she takes ibuprofen prn.  I attempted unsuccessfully a D&C hysteroscopy on 2021.  The patients bleeding continued.     She  had a gastric sleeve in 2017 and lost about 150 pounds from her high of over 500 lbs. She gained some weight back while on a month of steroids for optic neuritis.  She is scheduled to have a gastric bypass later this year.         OF NOTE: h/o  x 2 ( and  c BTL); Novasure endometrial ablation 2018; Gastric Sleeve 2017; 64; takes xanax occasionally for night terrors and panic attacks that started with the death of a family member.  Also has a d/o depression for which she did take venlafaxine but has since stopped.     OBJECTIVE   Vitals:    22 0954   BP: (!) 156/93   Pulse: 77      Body mass index is 60.16 kg/m².     Physical Exam:   Constitutional: She is oriented to person, place, and time. She appears well-developed and well-nourished. No distress.    HENT:   Head: Normocephalic and atraumatic.    Eyes: Conjunctivae and EOM are normal.      Pulmonary/Chest: Effort normal. No respiratory distress.        Abdominal: Soft. She exhibits abdominal incision (healing well). She exhibits no distension and no mass. There is no abdominal tenderness. There is no rebound and no guarding. No hernia.     Genitourinary:    Genitourinary Comments: NEFG  Vagina - no lesions, no bleeding, cuff well healed  Cervix, Uterus, Adnexa - Surgically absent                 Neurological: She is alert and oriented to person, place, and time.    Skin: No rash noted.    Psychiatric: She has a normal mood and affect. Her behavior is normal.     ECOG status: 0    PATHOLOGY DATA  Pathology data reviewed.    ASSESSMENT    1. S/P RA-TLH/BS    2. Abnormal uterine bleeding    3. Morbid obesity with BMI of 50.0-59.9, adult    The patient is doing very well postoperatively.  Based on her benign pathology, she can follow-up with her regular healthcare team.  She will return to see me if there are concerns regarding her surgery.    I recommended starting Miralax for constipation and following up with PCP.       PLAN  1.  Patient to  follow-up with regular health care team for routine health maintenance  2.  Follow-up with me if any issues related to her recent surgery  3.  Continue pelvic rest for full 8 weeks after day of surgery      Mg Del Toro MD

## 2022-02-01 ENCOUNTER — TELEPHONE (OUTPATIENT)
Dept: GYNECOLOGIC ONCOLOGY | Facility: CLINIC | Age: 37
End: 2022-02-01
Payer: MEDICAID

## 2022-02-01 NOTE — TELEPHONE ENCOUNTER
Patient requesting to return to work on tomorrow rather than 2/14/22. 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 Patient advised of pelvic rest x 8 weeks from date of surgery on 12/14/21. Patient vocalized understanding. Letter sent via email per pt's request.

## 2022-02-01 NOTE — TELEPHONE ENCOUNTER
----- Message from Bri Gallego sent at 2/1/2022  9:50 AM CST -----  Regarding: return to work  Name of Who is Calling: JACKIE PIPER [[id number]]      What is the request in detail: Patient is requesting to return to work on tomorrow she states she needs a new return to work dated for tomorrow       Can the clinic reply by [facility]: no      [facility] Back [facility]: [phone]

## 2022-09-19 ENCOUNTER — HOSPITAL ENCOUNTER (EMERGENCY)
Facility: HOSPITAL | Age: 37
Discharge: HOME | End: 2022-09-19
Attending: SURGERY
Payer: MEDICAID

## 2022-09-19 VITALS
OXYGEN SATURATION: 100 % | DIASTOLIC BLOOD PRESSURE: 83 MMHG | RESPIRATION RATE: 18 BRPM | SYSTOLIC BLOOD PRESSURE: 185 MMHG | BODY MASS INDEX: 55.21 KG/M2 | WEIGHT: 293 LBS | TEMPERATURE: 97 F | HEART RATE: 68 BPM

## 2022-09-19 DIAGNOSIS — R51.9 NONINTRACTABLE HEADACHE, UNSPECIFIED CHRONICITY PATTERN, UNSPECIFIED HEADACHE TYPE: Primary | ICD-10-CM

## 2022-09-19 DIAGNOSIS — R07.89 NON-CARDIAC CHEST PAIN: ICD-10-CM

## 2022-09-19 DIAGNOSIS — F41.9 ANXIETY: ICD-10-CM

## 2022-09-19 LAB
ALBUMIN SERPL BCP-MCNC: 3.7 G/DL (ref 3.5–5.2)
ALP SERPL-CCNC: 66 U/L (ref 55–135)
ALT SERPL W/O P-5'-P-CCNC: 28 U/L (ref 10–44)
AMPHET+METHAMPHET UR QL: NEGATIVE
ANION GAP SERPL CALC-SCNC: 11 MMOL/L (ref 8–16)
AST SERPL-CCNC: 21 U/L (ref 10–40)
B-HCG UR QL: NEGATIVE
BARBITURATES UR QL SCN>200 NG/ML: NEGATIVE
BASOPHILS # BLD AUTO: 0.03 K/UL (ref 0–0.2)
BASOPHILS NFR BLD: 0.5 % (ref 0–1.9)
BENZODIAZ UR QL SCN>200 NG/ML: ABNORMAL
BILIRUB SERPL-MCNC: 0.5 MG/DL (ref 0.1–1)
BILIRUB UR QL STRIP: NEGATIVE
BUN SERPL-MCNC: 10 MG/DL (ref 6–20)
BZE UR QL SCN: NEGATIVE
CALCIUM SERPL-MCNC: 9.4 MG/DL (ref 8.7–10.5)
CANNABINOIDS UR QL SCN: NEGATIVE
CHLORIDE SERPL-SCNC: 105 MMOL/L (ref 95–110)
CK SERPL-CCNC: 193 U/L (ref 20–180)
CLARITY UR: CLEAR
CO2 SERPL-SCNC: 25 MMOL/L (ref 23–29)
COLOR UR: YELLOW
CREAT SERPL-MCNC: 0.8 MG/DL (ref 0.5–1.4)
CREAT UR-MCNC: 200.6 MG/DL (ref 15–325)
DIFFERENTIAL METHOD: NORMAL
EOSINOPHIL # BLD AUTO: 0.2 K/UL (ref 0–0.5)
EOSINOPHIL NFR BLD: 2.6 % (ref 0–8)
ERYTHROCYTE [DISTWIDTH] IN BLOOD BY AUTOMATED COUNT: 13.2 % (ref 11.5–14.5)
EST. GFR  (NO RACE VARIABLE): >60 ML/MIN/1.73 M^2
GLUCOSE SERPL-MCNC: 93 MG/DL (ref 70–110)
GLUCOSE UR QL STRIP: NEGATIVE
HCT VFR BLD AUTO: 41.1 % (ref 37–48.5)
HGB BLD-MCNC: 13.3 G/DL (ref 12–16)
HGB UR QL STRIP: NEGATIVE
IMM GRANULOCYTES # BLD AUTO: 0.02 K/UL (ref 0–0.04)
IMM GRANULOCYTES NFR BLD AUTO: 0.3 % (ref 0–0.5)
KETONES UR QL STRIP: NEGATIVE
LEUKOCYTE ESTERASE UR QL STRIP: NEGATIVE
LIPASE SERPL-CCNC: 23 U/L (ref 4–60)
LYMPHOCYTES # BLD AUTO: 2.6 K/UL (ref 1–4.8)
LYMPHOCYTES NFR BLD: 46 % (ref 18–48)
MCH RBC QN AUTO: 30 PG (ref 27–31)
MCHC RBC AUTO-ENTMCNC: 32.4 G/DL (ref 32–36)
MCV RBC AUTO: 93 FL (ref 82–98)
METHADONE UR QL SCN>300 NG/ML: NEGATIVE
MONOCYTES # BLD AUTO: 0.3 K/UL (ref 0.3–1)
MONOCYTES NFR BLD: 4.7 % (ref 4–15)
NEUTROPHILS # BLD AUTO: 2.6 K/UL (ref 1.8–7.7)
NEUTROPHILS NFR BLD: 45.9 % (ref 38–73)
NITRITE UR QL STRIP: NEGATIVE
NRBC BLD-RTO: 0 /100 WBC
OPIATES UR QL SCN: NEGATIVE
PCP UR QL SCN>25 NG/ML: NEGATIVE
PH UR STRIP: 7 [PH] (ref 5–8)
PLATELET # BLD AUTO: 258 K/UL (ref 150–450)
PMV BLD AUTO: 10.4 FL (ref 9.2–12.9)
POTASSIUM SERPL-SCNC: 3.8 MMOL/L (ref 3.5–5.1)
PROT SERPL-MCNC: 7 G/DL (ref 6–8.4)
PROT UR QL STRIP: ABNORMAL
RBC # BLD AUTO: 4.44 M/UL (ref 4–5.4)
SODIUM SERPL-SCNC: 141 MMOL/L (ref 136–145)
SP GR UR STRIP: 1.02 (ref 1–1.03)
TOXICOLOGY INFORMATION: ABNORMAL
TROPONIN I SERPL DL<=0.01 NG/ML-MCNC: <0.006 NG/ML (ref 0–0.03)
URN SPEC COLLECT METH UR: ABNORMAL
UROBILINOGEN UR STRIP-ACNC: NEGATIVE EU/DL
WBC # BLD AUTO: 5.74 K/UL (ref 3.9–12.7)

## 2022-09-19 PROCEDURE — 99285 EMERGENCY DEPT VISIT HI MDM: CPT | Mod: 25

## 2022-09-19 PROCEDURE — 80307 DRUG TEST PRSMV CHEM ANLYZR: CPT | Performed by: SURGERY

## 2022-09-19 PROCEDURE — 81003 URINALYSIS AUTO W/O SCOPE: CPT | Mod: 59 | Performed by: SURGERY

## 2022-09-19 PROCEDURE — 80053 COMPREHEN METABOLIC PANEL: CPT | Performed by: SURGERY

## 2022-09-19 PROCEDURE — 36415 COLL VENOUS BLD VENIPUNCTURE: CPT | Performed by: SURGERY

## 2022-09-19 PROCEDURE — 93010 ELECTROCARDIOGRAM REPORT: CPT | Mod: ,,, | Performed by: INTERNAL MEDICINE

## 2022-09-19 PROCEDURE — 63600175 PHARM REV CODE 636 W HCPCS: Performed by: SURGERY

## 2022-09-19 PROCEDURE — 85025 COMPLETE CBC W/AUTO DIFF WBC: CPT | Performed by: SURGERY

## 2022-09-19 PROCEDURE — 96372 THER/PROPH/DIAG INJ SC/IM: CPT | Performed by: SURGERY

## 2022-09-19 PROCEDURE — 83690 ASSAY OF LIPASE: CPT | Performed by: SURGERY

## 2022-09-19 PROCEDURE — 81025 URINE PREGNANCY TEST: CPT | Performed by: SURGERY

## 2022-09-19 PROCEDURE — 93005 ELECTROCARDIOGRAM TRACING: CPT

## 2022-09-19 PROCEDURE — 82550 ASSAY OF CK (CPK): CPT | Performed by: SURGERY

## 2022-09-19 PROCEDURE — 84484 ASSAY OF TROPONIN QUANT: CPT | Performed by: SURGERY

## 2022-09-19 PROCEDURE — 93010 EKG 12-LEAD: ICD-10-PCS | Mod: ,,, | Performed by: INTERNAL MEDICINE

## 2022-09-19 RX ORDER — ONDANSETRON 2 MG/ML
4 INJECTION INTRAMUSCULAR; INTRAVENOUS
Status: COMPLETED | OUTPATIENT
Start: 2022-09-19 | End: 2022-09-19

## 2022-09-19 RX ORDER — BUTALBITAL, ACETAMINOPHEN AND CAFFEINE 50; 325; 40 MG/1; MG/1; MG/1
1 TABLET ORAL EVERY 4 HOURS PRN
Qty: 20 TABLET | Refills: 0 | Status: SHIPPED | OUTPATIENT
Start: 2022-09-19 | End: 2022-10-19

## 2022-09-19 RX ORDER — HYDROMORPHONE HYDROCHLORIDE 1 MG/ML
1 INJECTION, SOLUTION INTRAMUSCULAR; INTRAVENOUS; SUBCUTANEOUS
Status: COMPLETED | OUTPATIENT
Start: 2022-09-19 | End: 2022-09-19

## 2022-09-19 RX ADMIN — HYDROMORPHONE HYDROCHLORIDE 1 MG: 1 INJECTION, SOLUTION INTRAMUSCULAR; INTRAVENOUS; SUBCUTANEOUS at 02:09

## 2022-09-19 RX ADMIN — ONDANSETRON HYDROCHLORIDE 4 MG: 2 SOLUTION INTRAMUSCULAR; INTRAVENOUS at 02:09

## 2022-09-19 NOTE — ED PROVIDER NOTES
Encounter Date: 2022       History     Chief Complaint   Patient presents with    Headache    Neck Pain    Chest Pain     36-year-old female with a chronic migraine headache this morning  Patient has recurrent migraine headaches, often with chest pain  Patient states her migraine headaches results in CP and neck pain  Patient has a normal neuro exam with no signs of distress noted  Patient has a normal EKG with no cardiac history reported today  Patient also states she suffers from anxiety, makes HA worse    Review of patient's allergies indicates:  No Known Allergies  Past Medical History:   Diagnosis Date    Diabetes mellitus     Dysmenorrhea     Essential hypertension 2020    Excessive menses     Excessive or frequent menstruation 2018    HLD (hyperlipidemia)     Morbid obesity with BMI of 50.0-59.9, adult 2020    Wears glasses      Past Surgical History:   Procedure Laterality Date    ABDOMINAL SURGERY      gastric sleeve     SECTION      X 2    ESOPHAGOGASTRODUODENOSCOPY      HERNIA REPAIR      HYSTEROSCOPY WITH DILATION AND CURETTAGE OF UTERUS N/A 2021    Procedure: HYSTEROSCOPY, WITH DILATION AND CURETTAGE OF UTERUS;  Surgeon: Mg Del Toro MD;  Location: [facility];  Service: OB/GYN;  Laterality: N/A;    ROBOT-ASSISTED LAPAROSCOPIC ABDOMINAL HYSTERECTOMY USING DA ALIDA XI N/A 2021    Procedure: XI ROBOTIC HYSTERECTOMY;  Surgeon: Mg Del Toro MD;  Location: [facility] OR;  Service: OB/GYN;  Laterality: N/A;    ROBOT-ASSISTED SURGICAL REMOVAL OF FALLOPIAN TUBE USING DA ALIDA XI Bilateral 2021    Procedure: XI ROBOTIC SALPINGECTOMY;  Surgeon: Mg Del Toro MD;  Location: [facility] OR;  Service: OB/GYN;  Laterality: Bilateral;    TONSILLECTOMY       Family History   Problem Relation Age of Onset    Hypertension Mother     COPD Father     Hypertension Father      Social History     Tobacco Use    Smoking status: Never    Smokeless tobacco: Never   Substance Use Topics     Alcohol use: No    Drug use: No     Review of Systems   Constitutional: Negative.    HENT: Negative.     Eyes: Negative.    Respiratory: Negative.     Cardiovascular:  Positive for chest pain.   Gastrointestinal: Negative.    Genitourinary: Negative.    Musculoskeletal:  Positive for neck pain.   Skin: Negative.    Neurological:  Positive for headaches.   Psychiatric/Behavioral:  The patient is nervous/anxious.      Physical Exam     Initial Vitals [09/19/22 1051]   BP Pulse Resp Temp SpO2   (!) 185/83 68 16 97.4 °F (36.3 °C) 100 %      MAP       --         Physical Exam    Nursing note and vitals reviewed.  Constitutional: Vital signs are normal. She appears well-developed and well-nourished. She is cooperative.   HENT:   Head: Normocephalic and atraumatic.   Right Ear: External ear normal.   Left Ear: External ear normal.   Nose: Nose normal.   Mouth/Throat: Oropharynx is clear and moist.   Eyes: Conjunctivae, EOM and lids are normal. Pupils are equal, round, and reactive to light.   Neck: Trachea normal and phonation normal. Neck supple. No JVD present.   Normal range of motion.   Full passive range of motion without pain.     Cardiovascular:  Normal rate, regular rhythm, S1 normal, S2 normal, normal heart sounds, intact distal pulses and normal pulses.           Pulmonary/Chest: Effort normal and breath sounds normal.   Abdominal: Abdomen is soft and flat. Bowel sounds are normal.   Musculoskeletal:         General: Normal range of motion.      Cervical back: Full passive range of motion without pain, normal range of motion and neck supple.     Neurological: She is alert and oriented to person, place, and time. She has normal strength.   Skin: Skin is warm, dry and intact. Capillary refill takes less than 2 seconds.       ED Course   Procedures  Labs Reviewed   URINALYSIS, REFLEX TO URINE CULTURE - Abnormal; Notable for the following components:       Result Value    Protein, UA Trace (*)     All other  components within normal limits    Narrative:     Specimen Source->Urine   DRUG SCREEN PANEL, URINE EMERGENCY - Abnormal; Notable for the following components:    Benzodiazepines Presumptive Positive (*)     All other components within normal limits    Narrative:     Specimen Source->Urine   CK - Abnormal; Notable for the following components:     (*)     All other components within normal limits   LIPASE   PREGNANCY TEST, URINE RAPID    Narrative:     Specimen Source->Urine   COMPREHENSIVE METABOLIC PANEL   CBC W/ AUTO DIFFERENTIAL   TROPONIN I     EKG Readings: (Independently Interpreted)   Initial Reading: No STEMI. Rhythm: Normal Sinus Rhythm. Heart Rate: 70s. Ectopy: No Ectopy. Conduction: Normal. ST Segments: Normal ST Segments. T Waves: Normal. Axis: Normal.   ECG Results              EKG 12-lead (Final result)  Result time 09/19/22 12:45:29      Final result by Interface, Lab In [facility] (09/19/22 12:45:29)                   Narrative:    Test Reason : R07.9    Vent. Rate : 070 BPM     Atrial Rate : 070 BPM     P-R Int : 188 ms          QRS Dur : 094 ms      QT Int : 404 ms       P-R-T Axes : 021 009 015 degrees     QTc Int : 436 ms    Baseline Artifact  Normal sinus rhythm  Normal ECG  When compared with ECG of 07-DEC-2021 12:06,  No significant change was found  Confirmed by Sandeep Quispe MD ([id number]) on 9/19/2022 12:45:20 PM    Referred By: [email or web address]   SELF           Confirmed By:Sandeep Quispe MD                                  Imaging Results              X-Ray Chest 1 View (Final result)  Result time 09/19/22 13:42:20      Final result by Gee Reardon MD (09/19/22 13:42:20)                   Impression:      No evidence of an acute pulmonary process.      Electronically signed by: Gee Reardon MD  Date:    09/19/2022  Time:    13:42               Narrative:    EXAMINATION:  XR CHEST 1 VIEW    CLINICAL HISTORY:  Anxiety;    COMPARISON:  02/23/2016    FINDINGS:  Cardiac silhouette is normal in size.  No  focal infiltrate or pleural effusion.  No acute osseous finding.                                       CT Head Without Contrast (Final result)  Result time 09/19/22 13:38:06      Final result by Gee Reardon MD (09/19/22 13:38:06)                   Impression:      No evidence of an acute intracranial abnormality.      Electronically signed by: Gee Reardon MD  Date:    09/19/2022  Time:    13:38               Narrative:    EXAMINATION:  CT HEAD WITHOUT CONTRAST    CLINICAL HISTORY:  Headache, chronic, new features or increased frequency;    CT/nuclear cardiac exams in previous 12 months: None    TECHNIQUE:  Axial CT images were obtained and evaluated with multiplanar reformatted images.  Iterative reconstruction technique was used.    COMPARISON:  CT head 05/21/2020    FINDINGS:  No intracranial hemorrhage, mass, mass effect or recent infarct evident.  Gray-white matter differentiation appears maintained.  Ventricles are not enlarged.  Visualized paranasal sinuses and mastoid air cells are clear.  Calvarium is intact.                                       Medications   HYDROmorphone injection 1 mg (has no administration in time range)   ondansetron injection 4 mg (has no administration in time range)     Medical Decision Making:   Initial Assessment:   Patient with headache and neck pain this morning home with frequent chronic headaches  Also with chest pain and anxiety, no cardiac history reported today, normal sinus rhythm   Patient states he frequently gets chest pain and palpitations with migraine headache pain    Differential Diagnosis:   Anxiety, stress, angina, cardiac event, noncardiac chest pain  Headache, stress headache, migraine headache, chronic pain    Clinical Tests:   Lab Tests: Ordered and Reviewed  Radiological Study: Ordered and Reviewed  Medical Tests: Ordered and Reviewed    ED Management:  Negative head CT, negative chest x-ray an EKG in the emergency room today  Completely normal EKG with no  cardiac history, heart score 0 today in ER  Patient feels much better after injection for pain given the emergency room  Pregnancy test negative, will prescribe Fioricet on ER discharge for headache  Recommend Neurology and PCP follow-up as an outpatient, pain-free on DC  Carefully counseled return to the ER with any concerning symptoms after DC                        Clinical Impression:   Final diagnoses:  [F41.9] Anxiety  [R51.9] Nonintractable headache, unspecified chronicity pattern, unspecified headache type (Primary)  [R07.89] Non-cardiac chest pain        ED Disposition Condition    Discharge Stable          ED Prescriptions       Medication Sig Dispense Start Date End Date Auth. Provider    butalbital-acetaminophen-caffeine -40 mg (FIORICET, ESGIC) -40 mg per tablet Take 1 tablet by mouth every 4 (four) hours as needed. 20 tablet 9/19/2022 10/19/2022 Dov Alcocer MD          Follow-up Information       Follow up With Specialties Details Why Contact Info    Stephy Coffman MD Family Medicine Schedule an appointment as soon as possible for a visit in 2 days  [facility] [address] [facility] [id number]  [phone]               Dov Alcocer MD  09/19/22 [id number]

## 2023-12-01 ENCOUNTER — OFFICE VISIT (OUTPATIENT)
Dept: URGENT CARE | Facility: CLINIC | Age: 38
End: 2023-12-01
Payer: MEDICAID

## 2023-12-01 VITALS
TEMPERATURE: 98 F | DIASTOLIC BLOOD PRESSURE: 94 MMHG | WEIGHT: 243.19 LBS | HEIGHT: 66 IN | OXYGEN SATURATION: 97 % | HEART RATE: 109 BPM | BODY MASS INDEX: 39.08 KG/M2 | RESPIRATION RATE: 20 BRPM | SYSTOLIC BLOOD PRESSURE: 144 MMHG

## 2023-12-01 DIAGNOSIS — J10.1 INFLUENZA A: Primary | ICD-10-CM

## 2023-12-01 DIAGNOSIS — R50.9 FEVER, UNSPECIFIED FEVER CAUSE: ICD-10-CM

## 2023-12-01 LAB
CTP QC/QA: YES
CTP QC/QA: YES
POC MOLECULAR INFLUENZA A AGN: POSITIVE
POC MOLECULAR INFLUENZA B AGN: NEGATIVE
SARS-COV-2 AG RESP QL IA.RAPID: NEGATIVE

## 2023-12-01 PROCEDURE — 87811 SARS CORONAVIRUS 2 ANTIGEN POCT, MANUAL READ: ICD-10-PCS | Mod: QW,S$GLB,,

## 2023-12-01 PROCEDURE — 87502 POCT INFLUENZA A/B MOLECULAR: ICD-10-PCS | Mod: QW,S$GLB,,

## 2023-12-01 PROCEDURE — 87811 SARS-COV-2 COVID19 W/OPTIC: CPT | Mod: QW,S$GLB,,

## 2023-12-01 PROCEDURE — 87502 INFLUENZA DNA AMP PROBE: CPT | Mod: QW,S$GLB,,

## 2023-12-01 PROCEDURE — 99204 OFFICE O/P NEW MOD 45 MIN: CPT | Mod: S$GLB,,,

## 2023-12-01 PROCEDURE — 99204 PR OFFICE/OUTPT VISIT, NEW, LEVL IV, 45-59 MIN: ICD-10-PCS | Mod: S$GLB,,,

## 2023-12-01 RX ORDER — FLUTICASONE PROPIONATE 50 MCG
1 SPRAY, SUSPENSION (ML) NASAL DAILY
Qty: 9.9 ML | Refills: 0 | Status: SHIPPED | OUTPATIENT
Start: 2023-12-01

## 2023-12-01 RX ORDER — PROMETHAZINE HYDROCHLORIDE AND DEXTROMETHORPHAN HYDROBROMIDE 6.25; 15 MG/5ML; MG/5ML
5 SYRUP ORAL EVERY 6 HOURS PRN
Qty: 118 ML | Refills: 0 | Status: SHIPPED | OUTPATIENT
Start: 2023-12-01 | End: 2023-12-11

## 2023-12-01 RX ORDER — OSELTAMIVIR PHOSPHATE 75 MG/1
75 CAPSULE ORAL 2 TIMES DAILY
Qty: 10 CAPSULE | Refills: 0 | Status: SHIPPED | OUTPATIENT
Start: 2023-12-01 | End: 2023-12-06

## 2023-12-01 NOTE — PROGRESS NOTES
"Subjective:      Patient ID: Jeb Stuart is a 38 y.o. female.    Vitals:  height is 5' 6" (1.676 m) and weight is 110.3 kg (243 lb 2.7 oz). Her temperature is 98.3 °F (36.8 °C). Her blood pressure is 144/94 (abnormal) and her pulse is 109. Her respiration is 20 and oxygen saturation is 97%.     Chief Complaint: Cough    38 year old female presents today with cough, HA, congestion, nasal drainage, body aches, NV, fever at home 102 last night. No known exposure to anything. Never had COVID before that she knows of. COVID vaccinated. Symptoms started about a week ago. Treatments at home includes Mucinex DM with no relief. Requesting to be swabbed for COVID and Flu    Cough  This is a new problem. The current episode started in the past 7 days. The problem has been unchanged. The problem occurs constantly. The cough is Non-productive. Associated symptoms include a fever, headaches, myalgias, nasal congestion, postnasal drip, rhinorrhea and sweats. Pertinent negatives include no chest pain, chills, ear congestion, ear pain, heartburn, hemoptysis, rash, sore throat, shortness of breath, weight loss or wheezing. The symptoms are aggravated by lying down. She has tried OTC cough suppressant for the symptoms. There is no history of asthma, bronchiectasis, bronchitis, COPD, emphysema, environmental allergies or pneumonia.       Constitution: Positive for fever. Negative for chills.   HENT:  Positive for postnasal drip. Negative for ear pain and sore throat.    Cardiovascular:  Negative for chest pain.   Respiratory:  Positive for cough. Negative for bloody sputum, shortness of breath and wheezing.    Gastrointestinal:  Negative for heartburn.   Musculoskeletal:  Positive for muscle ache.   Skin:  Negative for rash.   Allergic/Immunologic: Negative for environmental allergies.   Neurological:  Positive for headaches.      Objective:     Physical Exam   Constitutional: She is oriented to person, place, and time. She " appears well-developed. She is cooperative.  Non-toxic appearance. She does not appear ill. No distress.   HENT:   Head: Normocephalic and atraumatic.   Ears:   Right Ear: Hearing, external ear and ear canal normal.   Left Ear: Hearing, external ear and ear canal normal.      Comments: Cloudy TM bilaterally.   Nose: Mucosal edema and congestion present. No rhinorrhea or nasal deformity. No epistaxis. Right sinus exhibits no maxillary sinus tenderness and no frontal sinus tenderness. Left sinus exhibits no maxillary sinus tenderness and no frontal sinus tenderness.   Mouth/Throat: Uvula is midline, oropharynx is clear and moist and mucous membranes are normal. Mucous membranes are moist. No trismus in the jaw. Normal dentition. No uvula swelling. No oropharyngeal exudate, posterior oropharyngeal edema or posterior oropharyngeal erythema.   Eyes: Conjunctivae and lids are normal. No scleral icterus.   Neck: Trachea normal and phonation normal. Neck supple. No edema present. No erythema present. No neck rigidity present.   Cardiovascular: Normal rate and regular rhythm.   Pulmonary/Chest: Effort normal and breath sounds normal. No respiratory distress. She has no decreased breath sounds. She has no wheezes. She has no rhonchi.   Abdominal: Normal appearance.   Musculoskeletal: Normal range of motion.         General: No deformity. Normal range of motion.   Neurological: She is alert and oriented to person, place, and time. She exhibits normal muscle tone. Coordination normal.   Skin: Skin is warm, dry, intact, not diaphoretic and not pale.   Psychiatric: Her speech is normal and behavior is normal. Judgment and thought content normal.   Nursing note and vitals reviewed.    Results for orders placed or performed in visit on 12/01/23   SARS Coronavirus 2 Antigen, POCT Manual Read   Result Value Ref Range    SARS Coronavirus 2 Antigen Negative Negative     Acceptable Yes    POCT Influenza A/B MOLECULAR    Result Value Ref Range    POC Molecular Influenza A Ag Positive (A) Negative, Not Reported    POC Molecular Influenza B Ag Negative Negative, Not Reported     Acceptable Yes        Assessment:     1. Influenza A    2. Screening for viral disease        Plan:       Influenza A  -     oseltamivir (TAMIFLU) 75 MG capsule; Take 1 capsule (75 mg total) by mouth 2 (two) times daily. for 5 days  Dispense: 10 capsule; Refill: 0  -     promethazine-dextromethorphan (PROMETHAZINE-DM) 6.25-15 mg/5 mL Syrp; Take 5 mLs by mouth every 6 (six) hours as needed (cough).  Dispense: 118 mL; Refill: 0  -     fluticasone propionate (FLONASE) 50 mcg/actuation nasal spray; 1 spray (50 mcg total) by Each Nostril route once daily.  Dispense: 9.9 mL; Refill: 0    Screening for viral disease  -     SARS Coronavirus 2 Antigen, POCT Manual Read  -     POCT Influenza A/B MOLECULAR    Flu A is positive and Covid is negative. Discussed results with patient.     Please drink plenty of fluids.  Please get plenty of rest.  Please return here or go to the Emergency Department for any concerns or worsening of condition.  If you do not have Hypertension or any history of palpitations, it is ok to take over the counter Sudafed or Mucinex D or Allegra-D or Claritin-D or Zyrtec-D.  If you do take one of the above, it is ok to combine that with plain over the counter Mucinex or Allegra or Claritin or Zyrtec.  If for example you are taking Zyrtec -D, you can combine that with Mucinex, but not Mucinex-D.  If you are taking Mucinex-D, you can combine that with plain Allegra or Claritin or Zyrtec.   If you do have Hypertension or palpitations, it is safe to take Coricidin HBP for relief of sinus symptoms.  If not allergic, please take over the counter Tylenol (Acetaminophen) and/or Motrin (Ibuprofen) as directed for control of pain and/or fever.  Please follow up with your primary care doctor or specialist as needed.    If you  smoke, please  stop smoking.  Discussed with patient the importance of f/u with their primary care provider. Urged to go to the ER for any worsening signs or symptoms.

## 2023-12-01 NOTE — PATIENT INSTRUCTIONS
You must understand that you have received treatment at an Urgent Care facility only, and that you may be  released before all of your medical problems are known or treated. Urgent Care facilities are not equipped to  handle life threatening emergencies. It is recommended that you seek care at an Emergency Department for  further evaluation of worsening or concerning symptoms, or possibly life threatening conditions as  discussed.    Please drink plenty of fluids.  Please get plenty of rest.  Please return here or go to the Emergency Department for any concerns or worsening of condition.  If you do not have Hypertension or any history of palpitations, it is ok to take over the counter Sudafed or Mucinex D or Allegra-D or Claritin-D or Zyrtec-D.  If you do take one of the above, it is ok to combine that with plain over the counter Mucinex or Allegra or Claritin or Zyrtec.  If for example you are taking Zyrtec -D, you can combine that with Mucinex, but not Mucinex-D.  If you are taking Mucinex-D, you can combine that with plain Allegra or Claritin or Zyrtec.   If you do have Hypertension or palpitations, it is safe to take Coricidin HBP for relief of sinus symptoms.  If not allergic, please take over the counter Tylenol (Acetaminophen) and/or Motrin (Ibuprofen) as directed for control of pain and/or fever.  Please follow up with your primary care doctor or specialist as needed.    If you  smoke, please stop smoking.  Discussed with patient the importance of f/u with their primary care provider. Urged to go to the ER for any worsening signs or symptoms.

## 2024-04-01 ENCOUNTER — OFFICE VISIT (OUTPATIENT)
Dept: URGENT CARE | Facility: CLINIC | Age: 39
End: 2024-04-01
Payer: MEDICAID

## 2024-04-01 VITALS
SYSTOLIC BLOOD PRESSURE: 168 MMHG | TEMPERATURE: 99 F | HEART RATE: 62 BPM | DIASTOLIC BLOOD PRESSURE: 92 MMHG | OXYGEN SATURATION: 98 % | WEIGHT: 238 LBS | HEIGHT: 66 IN | BODY MASS INDEX: 38.25 KG/M2 | RESPIRATION RATE: 18 BRPM

## 2024-04-01 DIAGNOSIS — B37.2 YEAST DERMATITIS: ICD-10-CM

## 2024-04-01 DIAGNOSIS — N89.8 VAGINAL DISCHARGE: ICD-10-CM

## 2024-04-01 DIAGNOSIS — R03.0 ELEVATED BLOOD PRESSURE READING: ICD-10-CM

## 2024-04-01 DIAGNOSIS — N76.0 BACTERIAL VAGINOSIS: Primary | ICD-10-CM

## 2024-04-01 DIAGNOSIS — B96.89 BACTERIAL VAGINOSIS: Primary | ICD-10-CM

## 2024-04-01 LAB
BILIRUB UR QL STRIP: NEGATIVE
GLUCOSE UR QL STRIP: NEGATIVE
KETONES UR QL STRIP: NEGATIVE
LEUKOCYTE ESTERASE UR QL STRIP: NEGATIVE
PH, POC UA: 6.5 (ref 5–8)
POC BLOOD, URINE: NEGATIVE
POC NITRATES, URINE: NEGATIVE
PROT UR QL STRIP: NEGATIVE
SP GR UR STRIP: 1.01 (ref 1–1.03)
UROBILINOGEN UR STRIP-ACNC: 4 (ref 0.1–1.1)

## 2024-04-01 PROCEDURE — 87591 N.GONORRHOEAE DNA AMP PROB: CPT | Performed by: NURSE PRACTITIONER

## 2024-04-01 PROCEDURE — 81003 URINALYSIS AUTO W/O SCOPE: CPT | Mod: QW,S$GLB,, | Performed by: NURSE PRACTITIONER

## 2024-04-01 PROCEDURE — 99214 OFFICE O/P EST MOD 30 MIN: CPT | Mod: S$GLB,,, | Performed by: NURSE PRACTITIONER

## 2024-04-01 PROCEDURE — 81514 NFCT DS BV&VAGINITIS DNA ALG: CPT | Performed by: NURSE PRACTITIONER

## 2024-04-01 RX ORDER — FLUCONAZOLE 150 MG/1
150 TABLET ORAL DAILY
Qty: 2 TABLET | Refills: 1 | Status: SHIPPED | OUTPATIENT
Start: 2024-04-01 | End: 2024-04-02

## 2024-04-01 RX ORDER — NYSTATIN 100000 U/G
CREAM TOPICAL 2 TIMES DAILY
Qty: 30 G | Refills: 1 | Status: SHIPPED | OUTPATIENT
Start: 2024-04-01 | End: 2024-04-15

## 2024-04-01 RX ORDER — METRONIDAZOLE 500 MG/1
500 TABLET ORAL EVERY 12 HOURS
Qty: 14 TABLET | Refills: 0 | Status: SHIPPED | OUTPATIENT
Start: 2024-04-01 | End: 2024-04-08

## 2024-04-01 NOTE — PROGRESS NOTES
"Subjective:      Patient ID: Jeb Stuart is a 38 y.o. female.    Vitals:  height is 5' 6" (1.676 m) and weight is 108 kg (238 lb). Her temperature is 98.6 °F (37 °C). Her blood pressure is 168/92 (abnormal) and her pulse is 62. Her respiration is 18 and oxygen saturation is 98%.     Chief Complaint: Dysuria    38 year old female presents today with dysuria, vaginal irritation, discharge with a grey color and foul odor. Hx of BV, last episode was 4 years ago. Symptoms started 03/30/2024. Treatments at home includes AZO with no relief.     Dysuria   The current episode started in the past 7 days. The problem has been unchanged. The quality of the pain is described as burning. The pain is at a severity of 9/10. There has been no fever. She is Sexually active. There is No history of pyelonephritis. Associated symptoms include a discharge and rash. Pertinent negatives include no behavior changes, chills, flank pain, frequency, hematuria, hesitancy, nausea, possible pregnancy, sweats, urgency, vomiting, weight loss, bubble bath use, constipation or withholding. Treatments tried: AZO.       Constitution: Negative. Negative for chills.   Neck: neck negative.   Cardiovascular: Negative.    Respiratory: Negative.     Gastrointestinal:  Negative for nausea, vomiting and constipation.   Genitourinary:  Positive for dysuria and vaginal discharge. Negative for frequency, urgency, flank pain and hematuria.   Skin:  Positive for rash.      Objective:     Physical Exam   Constitutional: She is oriented to person, place, and time. She appears well-developed. She is cooperative.  Non-toxic appearance. She does not appear ill. No distress.   HENT:   Head: Normocephalic and atraumatic.   Ears:   Right Ear: Hearing and external ear normal.   Left Ear: Hearing and external ear normal.   Nose: Nose normal. No mucosal edema or nasal deformity. No epistaxis. Right sinus exhibits no maxillary sinus tenderness and no frontal sinus " tenderness. Left sinus exhibits no maxillary sinus tenderness and no frontal sinus tenderness.   Mouth/Throat: Uvula is midline, oropharynx is clear and moist and mucous membranes are normal. No trismus in the jaw. Normal dentition. No uvula swelling.   Eyes: Conjunctivae and lids are normal.   Neck: Trachea normal and phonation normal. Neck supple.   Cardiovascular: Normal rate and normal pulses.   Pulmonary/Chest: Effort normal.   Abdominal: Normal appearance and bowel sounds are normal. Soft.   Musculoskeletal: Normal range of motion.         General: Normal range of motion.   Neurological: She is alert and oriented to person, place, and time. She exhibits normal muscle tone.   Skin: Skin is warm, dry, intact, not diaphoretic and rash.        Psychiatric: Her speech is normal and behavior is normal. Judgment and thought content normal.   Nursing note and vitals reviewed.      Assessment:     1. Bacterial vaginosis    2. Vaginal discharge    3. Yeast dermatitis    4. Elevated blood pressure reading        Plan:       Bacterial vaginosis  -     POCT Urinalysis, Dipstick, Automated, W/O Scope  -     metroNIDAZOLE (FLAGYL) 500 MG tablet; Take 1 tablet (500 mg total) by mouth every 12 (twelve) hours. for 7 days  Dispense: 14 tablet; Refill: 0    Vaginal discharge  -     VAGINOSIS SCREEN BY DNA PROBE  -     C. trachomatis/N. gonorrhoeae by AMP DNA [facility]; Vagina    Yeast dermatitis  -     fluconazole (DIFLUCAN) 150 MG Tab; Take 1 tablet (150 mg total) by mouth once daily. May repeat in 4 days if necessary. for 1 day  Dispense: 2 tablet; Refill: 1  -     nystatin (MYCOSTATIN) cream; Apply topically 2 (two) times daily. for 14 days  Dispense: 30 g; Refill: 1    Elevated blood pressure reading    Discussed with patient that blood pressure today was above 120/80 and that illness, anxiety or pain can cause a temporary rise in blood pressure and later returns to normal. Instructed to have blood pressure measured again  within the next few days and to follow up with PCP for further evaluation if BP continues to be elevated .      Patient Instructions   1.  Take all medications as directed. If you have been prescribed antibiotics, make sure to complete them.   2.  Rest and keep yourself/patient well hydrated. For adults, it is recommended to drink at least 8-10 glasses of water daily.   3.  For patients above 6 months of age who are not allergic to and are not on anticoagulants, you can alternate Tylenol and Motrin every 4-6 hours for fever above 100.4F and/or pain.  For patients less than 6 months of age, allergic to or intolerant to NSAIDS, have gastritis, gastric ulcers, or history of GI bleeds, are pregnant, or are on anticoagulant therapy, you can take Tylenol every 4 hours as needed for fever above 100.4F and/or pain.   4. You should schedule a follow-up appointment with your Primary Care Provider/Pediatrician for recheck in 2-3 days or as directed at this visit.   5.  If your condition fails to improve in a timely manner, you should receive another evaluation by your Primary Care Provider/Pediatrician to discuss your concerns or return to urgent care for a recheck.  If your condition worsens at any time, you should report immediately to your nearest Emergency Department for further evaluation. **You must understand that you have received Urgent Care treatment only and that you may be released before all of your medical problems are known or treated. You, the patient, are responsible to arrange for follow-up care as instructed.

## 2024-04-03 LAB
BACTERIAL VAGINOSIS DNA: POSITIVE
CANDIDA GLABRATA DNA: NEGATIVE
CANDIDA KRUSEI DNA: NEGATIVE
CANDIDA RRNA VAG QL PROBE: NEGATIVE
T VAGINALIS RRNA GENITAL QL PROBE: NEGATIVE

## 2024-04-04 LAB
C TRACH RRNA SPEC QL NAA+PROBE: NEGATIVE
N GONORRHOEA RRNA SPEC QL NAA+PROBE: NEGATIVE
N.GONORROHEAE, AMP RNA SOURCE: NORMAL
SPECIMEN SOURCE: NORMAL

## 2024-09-18 ENCOUNTER — HOSPITAL ENCOUNTER (EMERGENCY)
Facility: HOSPITAL | Age: 39
Discharge: HOME | End: 2024-09-18
Attending: EMERGENCY MEDICINE
Payer: MEDICAID

## 2024-09-18 VITALS
HEART RATE: 74 BPM | OXYGEN SATURATION: 100 % | DIASTOLIC BLOOD PRESSURE: 103 MMHG | RESPIRATION RATE: 16 BRPM | SYSTOLIC BLOOD PRESSURE: 189 MMHG | BODY MASS INDEX: 38.27 KG/M2 | HEIGHT: 66 IN | TEMPERATURE: 98 F | WEIGHT: 238.13 LBS

## 2024-09-18 DIAGNOSIS — J06.9 VIRAL URI: ICD-10-CM

## 2024-09-18 DIAGNOSIS — R09.81 NASAL CONGESTION: ICD-10-CM

## 2024-09-18 LAB
INFLUENZA A, MOLECULAR: NEGATIVE
INFLUENZA B, MOLECULAR: NEGATIVE
SARS-COV-2 RDRP RESP QL NAA+PROBE: NEGATIVE
SPECIMEN SOURCE: NORMAL

## 2024-09-18 PROCEDURE — 25000003 PHARM REV CODE 250: Performed by: EMERGENCY MEDICINE

## 2024-09-18 PROCEDURE — 87502 INFLUENZA DNA AMP PROBE: CPT | Performed by: EMERGENCY MEDICINE

## 2024-09-18 PROCEDURE — 99283 EMERGENCY DEPT VISIT LOW MDM: CPT

## 2024-09-18 PROCEDURE — U0002 COVID-19 LAB TEST NON-CDC: HCPCS | Performed by: EMERGENCY MEDICINE

## 2024-09-18 RX ORDER — ONDANSETRON 4 MG/1
4 TABLET, ORALLY DISINTEGRATING ORAL
Status: COMPLETED | OUTPATIENT
Start: 2024-09-18 | End: 2024-09-18

## 2024-09-18 RX ORDER — ACETAMINOPHEN 500 MG
1000 TABLET ORAL
Status: COMPLETED | OUTPATIENT
Start: 2024-09-18 | End: 2024-09-18

## 2024-09-18 RX ADMIN — ONDANSETRON 4 MG: 4 TABLET, ORALLY DISINTEGRATING ORAL at 07:09

## 2024-09-18 RX ADMIN — ACETAMINOPHEN 1000 MG: 500 TABLET ORAL at 07:09

## 2024-09-18 NOTE — ED NOTES
Patient identifiers verified and correct for MS Stuart  C/C: Cough, headache, sore throat SEE NN  APPEARANCE: awake and alert in NAD. PAIN  10/10  SKIN: warm, dry and intact. No breakdown or bruising.  MUSCULOSKELETAL: Patient moving all extremities spontaneously, no obvious swelling or deformities noted. Ambulates independently.  RESPIRATORY: Denies shortness of breath.Respirations unlabored. Positive cough  CARDIAC: Denies CP, 2+ distal pulses; no peripheral edema  ABDOMEN: S/ND/NT, Denies nausea  : voids spontaneously, denies difficulty  Neurologic: AAO x 4; follows commands equal strength in all extremities; denies numbness/tingling. Denies dizziness Denies new weakness

## 2024-09-18 NOTE — DISCHARGE INSTRUCTIONS
I recommend that you continue taking TheraFlu and over-the-counter medications for a likely viral URI.  Your COVID and flu were negative.  Rest, stay hydrated. Follow up with your primary doctor.    Seek immediate medical attention if you develop difficulty breathing, or any other concern.

## 2024-09-18 NOTE — ED PROVIDER NOTES
Encounter Date: 2024       History     Chief Complaint   Patient presents with    Vomiting     Nausea    Cough     With congestion started     Sore Throat     With headache       Patient is a 38-year-old female with past medical history of diabetes mellitus, hypertension, hyperlipidemia who presents with complaints of congestion, headache, nausea and vomiting for the past 2 days.  She states initially her symptoms started 3 days ago but the next day she felt much better and got a flu shot.  It was at day she developed nausea and vomiting.  She states she has taken TheraFlu and other over-the-counter medications with no relief.  She notes an associated mild sore throat. Her headache is global and no neck pain. No neck stiffness. No vision changes.  She denies international travel or sick contacts.    The history is provided by the patient.     Review of patient's allergies indicates:   Allergen Reactions    Hydrocodone Itching    Latex, natural rubber Rash     Past Medical History:   Diagnosis Date    Diabetes mellitus     Dysmenorrhea     Essential hypertension 2020    Excessive menses     Excessive or frequent menstruation 2018    HLD (hyperlipidemia)     Morbid obesity with BMI of 50.0-59.9, adult 2020    Wears glasses      Past Surgical History:   Procedure Laterality Date    ABDOMINAL SURGERY      gastric sleeve     SECTION      X 2    ESOPHAGOGASTRODUODENOSCOPY      HERNIA REPAIR      HYSTEROSCOPY WITH DILATION AND CURETTAGE OF UTERUS N/A 2021    Procedure: HYSTEROSCOPY, WITH DILATION AND CURETTAGE OF UTERUS;  Surgeon: Mg Del Toro MD;  Location: [facility];  Service: OB/GYN;  Laterality: N/A;    ROBOT-ASSISTED LAPAROSCOPIC ABDOMINAL HYSTERECTOMY USING DA ALIDA XI N/A 2021    Procedure: XI ROBOTIC HYSTERECTOMY;  Surgeon: Mg Del Toro MD;  [place] [facility] [place] [place] [facility];  Service: OB/GYN;  Laterality: N/A;    ROBOT-ASSISTED SURGICAL REMOVAL OF FALLOPIAN TUBE USING DA ALIDA  XI Bilateral 12/14/2021    Procedure: XI ROBOTIC SALPINGECTOMY;  Surgeon: Mg Del Toro MD;  Location: [facility];  Service: OB/GYN;  Laterality: Bilateral;    TONSILLECTOMY       Family History   Problem Relation Name Age of Onset    Hypertension Mother      COPD Father      Hypertension Father       Social History     Tobacco Use    Smoking status: Never    Smokeless tobacco: Never   Substance Use Topics    Alcohol use: No    Drug use: No         Physical Exam     Initial Vitals [09/18/24 0631]   BP Pulse Resp Temp SpO2   (!) 140/99 75 17 98.3 °F (36.8 °C) 100 %      MAP       --         Physical Exam    Nursing note and vitals reviewed.  Constitutional: She appears well-developed and well-nourished. She is not diaphoretic. No distress.   HENT:   Head: Normocephalic and atraumatic.   Right Ear: External ear normal.   Left Ear: External ear normal.   Nose: Nose normal.   Mouth/Throat: Oropharynx is clear and moist. No oropharyngeal exudate.   Eyes: Conjunctivae and EOM are normal.   Neck: Neck supple.   Normal range of motion.  Cardiovascular:  Normal rate, regular rhythm and normal heart sounds.           Pulmonary/Chest: Breath sounds normal. No respiratory distress.   Abdominal: She exhibits no distension.   Musculoskeletal:         General: Normal range of motion.      Cervical back: Normal range of motion and neck supple.     Lymphadenopathy:     She has no cervical adenopathy.   Neurological: She is alert and oriented to person, place, and time. GCS score is 15. GCS eye subscore is 4. GCS verbal subscore is 5. GCS motor subscore is 6.   Skin: Skin is warm and dry.   Psychiatric: She has a normal mood and affect. Her behavior is normal. Judgment and thought content normal.         ED Course   Procedures  Labs Reviewed   INFLUENZA A & B BY MOLECULAR       Result Value    Influenza A, Molecular Negative      Influenza B, Molecular Negative      Flu A & B Source Nasal swab     SARS-COV-2 RNA AMPLIFICATION, QUAL     SARS-CoV-2 RNA, Amplification, Qual Negative            Imaging Results    None          Medications   acetaminophen tablet 1,000 mg (1,000 mg Oral Given 9/18/24 0733)   ondansetron disintegrating tablet 4 mg (4 mg Oral Given 9/18/24 0733)     Medical Decision Making  DDX: viral syndrome, strep, sinusitis, seasonal allergies, tension headache    History of hysterectomy so canceled preg test  Otherwise covid, flu pending  Will treat symptoms with odt zofran, tylenol  No signs of strep pharyngitis on exam    Amount and/or Complexity of Data Reviewed  Labs: ordered.    Risk  OTC drugs.  Prescription drug management.                                      Clinical Impression:  Final diagnoses:  [R09.81] Nasal congestion  [J06.9] Viral URI          ED Disposition Condition    Discharge Stable          ED Prescriptions    None       Follow-up Information       Follow up With Specialties Details Why Contact Info    Stephy Coffman MD Psychiatry In 1 week  START [address] [address]  [phone]      [facility] - Emergency Dept Emergency Medicine  As needed [address] [facility] [id number] [phone]             Aarti Greco MD  09/25/24 6213

## 2025-07-21 ENCOUNTER — OFFICE VISIT (OUTPATIENT)
Dept: URGENT CARE | Facility: CLINIC | Age: 40
End: 2025-07-21
Payer: MEDICAID

## 2025-07-21 VITALS
SYSTOLIC BLOOD PRESSURE: 106 MMHG | HEART RATE: 99 BPM | BODY MASS INDEX: 30.39 KG/M2 | WEIGHT: 189.06 LBS | OXYGEN SATURATION: 99 % | RESPIRATION RATE: 18 BRPM | HEIGHT: 66 IN | TEMPERATURE: 99 F | DIASTOLIC BLOOD PRESSURE: 69 MMHG

## 2025-07-21 DIAGNOSIS — T81.49XA SURGICAL SITE INFECTION: Primary | ICD-10-CM

## 2025-07-21 DIAGNOSIS — K59.00 CONSTIPATION, UNSPECIFIED CONSTIPATION TYPE: ICD-10-CM

## 2025-07-21 PROCEDURE — 87070 CULTURE OTHR SPECIMN AEROBIC: CPT

## 2025-07-21 PROCEDURE — 99214 OFFICE O/P EST MOD 30 MIN: CPT | Mod: S$GLB,,,

## 2025-07-21 RX ORDER — TOPIRAMATE 25 MG/1
25 TABLET, FILM COATED ORAL NIGHTLY
COMMUNITY
Start: 2025-06-17

## 2025-07-21 RX ORDER — NIFEDIPINE 30 MG/1
30 TABLET, EXTENDED RELEASE ORAL
COMMUNITY

## 2025-07-21 RX ORDER — SULFAMETHOXAZOLE AND TRIMETHOPRIM 800; 160 MG/1; MG/1
2 TABLET ORAL 2 TIMES DAILY
Qty: 28 TABLET | Refills: 0 | Status: SHIPPED | OUTPATIENT
Start: 2025-07-21 | End: 2025-07-28

## 2025-07-21 RX ORDER — POLYETHYLENE GLYCOL 3350 17 G/17G
17 POWDER, FOR SOLUTION ORAL DAILY
Qty: 510 G | Refills: 0 | Status: SHIPPED | OUTPATIENT
Start: 2025-07-21

## 2025-07-21 RX ORDER — AMLODIPINE BESYLATE 10 MG/1
10 TABLET ORAL
COMMUNITY
Start: 2025-06-28

## 2025-07-21 RX ORDER — UBROGEPANT 100 MG/1
TABLET ORAL
COMMUNITY
Start: 2025-06-26

## 2025-07-21 NOTE — PROGRESS NOTES
"Subjective:      Patient ID: Jeb Stuart is a 39 y.o. female.    Vitals:  height is 5' 6" (1.676 m) and weight is 85.7 kg (189 lb 0.7 oz). Her oral temperature is 98.8 °F (37.1 °C). Her blood pressure is 106/69 and her pulse is 99. Her respiration is 18 and oxygen saturation is 99%.     Chief Complaint: Headache and Arm Pain    38 y/o female here with headache and pain to one of her surgical sites.  Patient had bilateral brachioplasty on 7/10.  Recovery has been uneventful until 4 days ago when she started with worsening pain to her R side and a headache.  She noticed the drainage from her right side looks cloudy.  Confirms she completed the 2 day course of cephalexin that she was given post-op.  Taking Topamax and ibuprofen for the headache.   Also admits to constipation.  Initially was taking percocet.  Bowels have not moved since surgery.  Taking Linzess daily for chronic constipation. Denies fever, confusion, weakness, palpitations.      Headache   This is a new problem. The current episode started in the past 7 days. The problem has been gradually worsening. Pain location: all. The quality of the pain is described as throbbing and aching. The pain is at a severity of 10/10. The pain is severe. Pertinent negatives include no dizziness, fever, muscle aches, nausea, numbness, tingling or vomiting. Treatments tried: Topamax and Ibuprofen. The treatment provided mild relief. Her past medical history is significant for hypertension and migraine headaches.   Arm Pain   Her dominant hand is their right hand. The incident occurred 3 to 5 days ago. Injury mechanism: surgery. The pain is present in the right shoulder and upper right arm. The quality of the pain is described as aching. The pain radiates to the back (L and R breast). The pain is at a severity of 9/10. The pain is severe. The pain has been Worsening since the incident. Pertinent negatives include no chest pain, muscle weakness, numbness or " tingling. Nothing aggravates the symptoms. She has tried NSAIDs (Ibuprofen) for the symptoms. The treatment provided no relief.       Constitution: Negative for activity change, appetite change, chills, sweating and fever.   Cardiovascular:  Negative for chest pain.   Gastrointestinal:  Negative for nausea and vomiting.   Neurological:  Positive for headaches and history of migraines. Negative for dizziness and numbness.      Objective:     Physical Exam   Constitutional: She is oriented to person, place, and time.  Non-toxic appearance. She does not appear ill. No distress.   HENT:   Head: Normocephalic and atraumatic.   Ears:   Right Ear: External ear normal.   Left Ear: External ear normal.   Nose: Nose normal.   Eyes: Conjunctivae are normal.   Cardiovascular: Normal rate, regular rhythm and normal heart sounds.   Pulmonary/Chest: Effort normal and breath sounds normal.   Abdominal: Normal appearance.   Neurological: She is alert and oriented to person, place, and time.   Skin: Skin is warm, dry and not diaphoretic. Capillary refill takes less than 2 seconds.         Comments: Surgical sites well-approximated, no drainage or erythema.  L sided MURIEL drain insertion site intact, dry and without erythema.  R sided MURIEL insertion site with purulent drainage, tender and indurated. MURIEL drainage purulent on R side, serous from L side.    Psychiatric: Her behavior is normal.   Nursing note and vitals reviewed.      Assessment:     1. Surgical site infection    2. Constipation, unspecified constipation type        Plan:       Surgical site infection  -     sulfamethoxazole-trimethoprim 800-160mg (BACTRIM DS) 800-160 mg Tab; Take 2 tablets by mouth 2 (two) times daily. for 7 days  Dispense: 28 tablet; Refill: 0  -     Aerobic culture    Constipation, unspecified constipation type  -     polyethylene glycol (GLYCOLAX) 17 gram/dose powder; Take 17 g by mouth once daily.  Dispense: 510 g; Refill: 0      Patient Instructions   1.   Complete the course of antibiotics.  Take all other medications as directed.  Monitor your symptoms closely. I have included an article on sepsis.  This is a condition caused by infection that we are trying to avoid.  Please go to the ER if you have signs of sepsis otherwise follow up with your surgeon as scheduled on Wednesday.  See attached handout for more information regarding your condition and how to manage it.  2.  Rest and keep yourself/patient well hydrated.  3.  You can alternate Tylenol and Motrin every 4-6 hours for fever above 100.4F and/or pain.   4. You should schedule a follow-up appointment with your Primary Care Provider for recheck in 2-3 days or as directed at this visit.   5.  If your condition fails to improve in a timely manner, you should receive another evaluation by your Primary Care Provider to discuss your concerns or return to urgent care for a recheck.  If your condition worsens at any time, you should report immediately to your nearest Emergency Department for further evaluation. **You must understand that you have received Urgent Care treatment only and that you may be released before all of your medical problems are known or treated. You, the patient, are responsible to arrange for follow-up care as instructed.           Medical Decision Making:   Urgent Care Management:  Discussed case and treatment plan with Corrina Ren and Nicole.  Patient presents with surgical site infection.  Vitals stable. Decision made to start oral antibiotics, monitor closely for any signs of sepsis at home and follow up as scheduled with her surgeon in 2 days.  Call also made to surgeon, Dr. Toscano's, office.  Left  requesting return call.  Patient in agreement with plan of care.  She agreed to reach out for any concerns or RTC/surgeon/ED as necessary.   Other:   I have discussed this case with another health care provider.

## 2025-07-21 NOTE — PATIENT INSTRUCTIONS
1.  Complete the course of antibiotics.  Take all other medications as directed.  Monitor your symptoms closely. I have included an article on sepsis.  This is a condition caused by infection that we are trying to avoid.  Please go to the ER if you have signs of sepsis otherwise follow up with your surgeon as scheduled on Wednesday.  See attached handout for more information regarding your condition and how to manage it.  2.  Rest and keep yourself/patient well hydrated.  3.  You can alternate Tylenol and Motrin every 4-6 hours for fever above 100.4F and/or pain.   4. You should schedule a follow-up appointment with your Primary Care Provider for recheck in 2-3 days or as directed at this visit.   5.  If your condition fails to improve in a timely manner, you should receive another evaluation by your Primary Care Provider to discuss your concerns or return to urgent care for a recheck.  If your condition worsens at any time, you should report immediately to your nearest Emergency Department for further evaluation. **You must understand that you have received Urgent Care treatment only and that you may be released before all of your medical problems are known or treated. You, the patient, are responsible to arrange for follow-up care as instructed.

## 2025-07-24 ENCOUNTER — TELEPHONE (OUTPATIENT)
Dept: URGENT CARE | Facility: CLINIC | Age: 40
End: 2025-07-24
Payer: MEDICAID

## 2025-07-24 NOTE — TELEPHONE ENCOUNTER
Called to f/u with patient and discuss culture results. +MRSA, Bactrim DS sensitive. Had f/u with surgeon yesterday and labs reviewed and d/c'd drains. Doing much better.

## 2025-07-26 LAB
BACTERIA SPEC AEROBE CULT: ABNORMAL
BACTERIA SPEC AEROBE CULT: ABNORMAL

## (undated) DEVICE — IRRIGATOR ENDOSCOPY DISP.

## (undated) DEVICE — PACK FLUENT DISPOSABLE

## (undated) DEVICE — SOL ELECTROLUBE ANTI-STIC

## (undated) DEVICE — SEAL UNIVERSAL 5MM-8MM XI

## (undated) DEVICE — JELLY SURGILUBE 5GR

## (undated) DEVICE — OCCLUDER COLPO-PNEUMO STERILE

## (undated) DEVICE — SET TRI-LUMEN FILTERED TUBE

## (undated) DEVICE — SYS SEE SHARP SCOPE ANTIFOG

## (undated) DEVICE — SEAL LENS SCOPE MYOSURE

## (undated) DEVICE — DRAPE ARM DAVINCI XI

## (undated) DEVICE — SOL IRR NACL .9% 3000ML

## (undated) DEVICE — SEE MEDLINE ITEM 156923

## (undated) DEVICE — SUT MCRYL PLUS 4-0 PS2 27IN

## (undated) DEVICE — PAD PERINEAL SUPINE

## (undated) DEVICE — COVER TIP CURVED SCISSORS XI

## (undated) DEVICE — SYR 10CC LUER LOCK

## (undated) DEVICE — OBTURATOR BLADELESS 8MM XI CLR

## (undated) DEVICE — DRAPE COLUMN DAVINCI XI

## (undated) DEVICE — NDL HYPO REG 25G X 1 1/2

## (undated) DEVICE — SOL BETADINE 5%

## (undated) DEVICE — SET BASIN 48X48IN 6000ML RING

## (undated) DEVICE — GLOVE BIOGEL SKINSENSE PI 6.5

## (undated) DEVICE — UNDERGLOVES BIOGEL PI SZ 7 LF

## (undated) DEVICE — GLOVE BIOGEL SKINSENSE PI 7.5

## (undated) DEVICE — TROCAR ENDOPATH XCEL 5X100MM

## (undated) DEVICE — SET CYSTO IRRIGATION UNIV SPIK

## (undated) DEVICE — UNDERGLOVES BIOGEL PI SIZE 8

## (undated) DEVICE — INSERT CUSHIONPRONE VIEW LARGE

## (undated) DEVICE — SOL PVP-I SCRUB 7.5% 4OZ

## (undated) DEVICE — SUT V-LOC ABSRB WOUND CLSR

## (undated) DEVICE — GLOVE BIOGEL SKINSENSE PI 7.0

## (undated) DEVICE — ELECTRODE REM PLYHSV RETURN 9

## (undated) DEVICE — SOL 9P NACL IRR PIC IL

## (undated) DEVICE — ADHESIVE DERMABOND ADVANCED

## (undated) DEVICE — DEVICE ANC SW STAT FOLEY 6-24

## (undated) DEVICE — TOWEL OR DISP STRL BLUE 4/PK

## (undated) DEVICE — SOL NS 1000CC

## (undated) DEVICE — SEALER VESSEL EXTEND

## (undated) DEVICE — Device

## (undated) DEVICE — TIP RUMI MANIPULATOR LAVENDER

## (undated) DEVICE — SUT VICRYL PLUS 0 CT1 36IN

## (undated) DEVICE — KIT WING PAD POSITIONING

## (undated) DEVICE — SOL WATER STRL IRR 1000ML